# Patient Record
Sex: MALE | Race: WHITE | HISPANIC OR LATINO | Employment: UNEMPLOYED | URBAN - METROPOLITAN AREA
[De-identification: names, ages, dates, MRNs, and addresses within clinical notes are randomized per-mention and may not be internally consistent; named-entity substitution may affect disease eponyms.]

---

## 2017-06-27 ENCOUNTER — NON-PROVIDER VISIT (OUTPATIENT)
Dept: OCCUPATIONAL MEDICINE | Facility: CLINIC | Age: 57
End: 2017-06-27

## 2017-06-27 DIAGNOSIS — Z02.1 PRE-EMPLOYMENT DRUG SCREENING: ICD-10-CM

## 2017-06-27 LAB
AMP AMPHETAMINE: NORMAL
COC COCAINE: NORMAL
INT CON NEG: NORMAL
INT CON POS: NORMAL
MET METHAMPHETAMINES: NORMAL
OPI OPIATES: NORMAL
PCP PHENCYCLIDINE: NORMAL
POC DRUG COMMENT 753798-OCCUPATIONAL HEALTH: NORMAL
THC: NORMAL

## 2017-06-27 PROCEDURE — 80305 DRUG TEST PRSMV DIR OPT OBS: CPT | Performed by: PREVENTIVE MEDICINE

## 2018-07-30 ENCOUNTER — OCCUPATIONAL MEDICINE (OUTPATIENT)
Dept: URGENT CARE | Facility: PHYSICIAN GROUP | Age: 58
End: 2018-07-30
Payer: COMMERCIAL

## 2018-07-30 VITALS
RESPIRATION RATE: 18 BRPM | SYSTOLIC BLOOD PRESSURE: 138 MMHG | WEIGHT: 151 LBS | HEART RATE: 72 BPM | DIASTOLIC BLOOD PRESSURE: 94 MMHG | OXYGEN SATURATION: 98 % | BODY MASS INDEX: 24.27 KG/M2 | HEIGHT: 66 IN | TEMPERATURE: 98.7 F

## 2018-07-30 DIAGNOSIS — M54.42 ACUTE BILATERAL LOW BACK PAIN WITH LEFT-SIDED SCIATICA: ICD-10-CM

## 2018-07-30 LAB
AMP AMPHETAMINE: NORMAL
BREATH ALCOHOL COMMENT: NORMAL
COC COCAINE: NORMAL
INT CON NEG: NORMAL
INT CON POS: NORMAL
MET METHAMPHETAMINES: NORMAL
OPI OPIATES: NORMAL
PCP PHENCYCLIDINE: NORMAL
POC BREATHALIZER: 0 PERCENT (ref 0–0.01)
POC DRUG COMMENT 753798-OCCUPATIONAL HEALTH: NORMAL
THC: NORMAL

## 2018-07-30 PROCEDURE — 82075 ASSAY OF BREATH ETHANOL: CPT | Performed by: PHYSICIAN ASSISTANT

## 2018-07-30 PROCEDURE — 80305 DRUG TEST PRSMV DIR OPT OBS: CPT | Performed by: PHYSICIAN ASSISTANT

## 2018-07-30 PROCEDURE — 99203 OFFICE O/P NEW LOW 30 MIN: CPT | Mod: 29 | Performed by: PHYSICIAN ASSISTANT

## 2018-07-30 ASSESSMENT — PAIN SCALES - GENERAL: PAINLEVEL: 8=MODERATE-SEVERE PAIN

## 2018-07-30 ASSESSMENT — ENCOUNTER SYMPTOMS
CONSTITUTIONAL NEGATIVE: 1
GASTROINTESTINAL NEGATIVE: 1

## 2018-07-30 NOTE — LETTER
"EMPLOYEE’S CLAIM FOR COMPENSATION/ REPORT OF INITIAL TREATMENT  FORM C-4    EMPLOYEE’S CLAIM - PROVIDE ALL INFORMATION REQUESTED   First Name  Gab Last Name  Александр Birthdate                    1960                Sex  male Claim Number   Home Address  [address] Age  57 y.o. 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    Employer's Name  [employer]  Telephone  [phone] [phone]    Employer Address  [address]  [place]  Zip  [address]    Date of Injury  7/30/2018               Hour of Injury  2:00 PM Date Employer Notified  7/30/2018 Last Day of Work after Injury or Occupational Disease  7/30/2018 Supervisor to Whom Injury Reported  Deja Cabrera    Address or Location of Accident (if applicable)  [[address] [address]]   What were you doing at the time of accident? (if applicable)  Dumping product waste    How did this injury or occupational disease occur? (Be specific an answer in detail. 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  No Witnesses to the Accident  Timothy       Nature of Injury or Occupational Disease  Strain  Part(s) of Body Injured or Affected  Lower Back Area (Lumbar Area & Lumbo-Sacral), ,     I certify that the above is true and correct to the best of my knowledge and that I have provided this information in " order to obtain the benefits of [place]  I hereby authorize any physician, chiropractor, surgeon, practitioner, or other person, any hospital, including [facility] hospital, any medical service organization, any insurance company, or other institution or organization to release to each other, any medical or other information, including benefits paid or payable, pertinent to this injury or disease, except information relative to diagnosis, treatment and/or counseling for AIDS, psychological conditions, alcohol or controlled substances, for which I must give specific authorization.  A Photostat of this authorization shall be as valid as the original.     Date   Place   Employee’s Signature   THIS REPORT MUST BE COMPLETED AND MAILED WITHIN 3 WORKING DAYS OF TREATMENT   Place  [facility]  Name of Facility  [facility]   Date  7/30/2018 Diagnosis  (M54.42) Acute bilateral low back pain with left-sided sciatica Is there evidence the injured employee was under the influence of alcohol and/or another controlled substance at the time of accident?   Hour  5:08 PM Description of Injury or Disease  The encounter diagnosis was Acute bilateral low back pain with left-sided sciatica. No   Treatment  -restrictions as above.    -bilateral lumbar paraspinous strain.   -Toradol shot declined  -back care discussed, proper lifting mechanics discussed  -recommend heat/ice prn.  Gentle stretches daily.   -initial trial of 800 mg Ibuprofen with food up to TID prn.   -back pain red flags and ER precautions discussed with patient.   Have you advised the patient to remain off work five days or more? No   X-Ray Findings      If Yes   From Date  To Date      From information given by the employee, together with medical evidence, can you directly connect this injury or occupational disease as  "job incurred?  Yes If No Full Duty  No Modified Duty  Yes   Is additional medical care by a physician indicated?  Yes If Modified Duty, Specify any Limitations / Restrictions      Do you know of any previous injury or disease contributing to this condition or occupational disease?                            Yes  Comments:Hx of lumbar surgeries and chronic lower back pain.    Date  7/30/2018 Print Doctor’s Name Arya Soni P.A.-C. I certify the employer’s copy of  this form was mailed on:   Address  [address] [place] [address]. [address] Insurer’s Use Only     [facility] Zip  [address]    Provider’s Tax ID Number  [id number] Telephone  Dept: [phone]        e-ARYA Burciaga P.A.-C.   e-Signature: Dr. Jin Rod, Medical Director Degree  AYAH        ORIGINAL-TREATING PHYSICIAN OR CHIROPRACTOR    PAGE 2-INSURER/TPA    PAGE 3-EMPLOYER    PAGE 4-EMPLOYEE             Form C-4 (rev10/07)              BRIEF DESCRIPTION OF RIGHTS AND BENEFITS  (Pursuant to [place] 616C.050)    Notice of Injury or Occupational Disease (Incident Report Form C-1): If an injury or occupational disease (OD) arises out of and in the  course of employment, you must provide written notice to your employer as soon as practicable, but no later than 7 days after the accident or  OD. Your employer shall maintain a sufficient supply of the required forms.    Claim for Compensation (Form C-4): If medical treatment is sought, the form C-4 is available at the place of initial treatment. A completed  \"Claim for Compensation\" (Form C-4) must be filed within 90 days after an accident or OD. The treating physician or chiropractor must,  within 3 working days after treatment, complete and mail to the employer, the employer's insurer and third-party , the Claim for  Compensation.    Medical Treatment: If you require medical treatment for your on-the-job injury or OD, you may be required to select a physician or  chiropractor from " a list provided by your workers’ compensation insurer, if it has contracted with an Organization for Managed Care (MCO) or  Preferred Provider Organization (PPO) or providers of health care. If your employer has not entered into a contract with an MCO or PPO, you  may select a physician or chiropractor from the Panel of Physicians and Chiropractors. Any medical costs related to your industrial injury or  OD will be paid by your insurer.    Temporary Total Disability (TTD): If your doctor has certified that you are unable to work for a period of at least 5 consecutive days, or 5  cumulative days in a 20-day period, or places restrictions on you that your employer does not accommodate, you may be entitled to TTD  compensation.    Temporary Partial Disability (TPD): If the wage you receive upon reemployment is less than the compensation for TTD to which you are  entitled, the insurer may be required to pay you TPD compensation to make up the difference. TPD can only be paid for a maximum of 24  months.    Permanent Partial Disability (PPD): When your medical condition is stable and there is an indication of a PPD as a result of your injury or  OD, within 30 days, your insurer must arrange for an evaluation by a rating physician or chiropractor to determine the degree of your PPD. The  amount of your PPD award depends on the date of injury, the results of the PPD evaluation and your age and wage.    Permanent Total Disability (PTD): If you are medically certified by a treating physician or chiropractor as permanently and totally disabled  and have been granted a PTD status by your insurer, you are entitled to receive monthly benefits not to exceed 66 2/3% of your average  monthly wage. The amount of your PTD payments is subject to reduction if you previously received a PPD award.    Vocational Rehabilitation Services: You may be eligible for vocational rehabilitation services if you are unable to return to the job due  to a  permanent physical impairment or permanent restrictions as a result of your injury or occupational disease.    Transportation and Per Adelina Reimbursement: You may be eligible for travel expenses and per adelina associated with medical treatment.    Reopening: You may be able to reopen your claim if your condition worsens after claim closure.    Appeal Process: If you disagree with a written determination issued by the insurer or the insurer does not respond to your request, you may  appeal to the Department of Administration, , by following the instructions contained in your determination letter. You must  appeal the determination within 70 days from the date of the determination letter at [address], [address], [place], [place]  [address], or [address], [address], [place], [place] [address]. If you disagree with the  decision, you may appeal to the  Department of Administration, . You must file your appeal within 30 days from the date of the  decision  letter at [address], [address], [place], [place] [address], or [address], [address], [place], [place] [address]. If you  disagree with a decision of an , you may file a petition for judicial review with the District Court. You must do so within 30  days of the Appeal Officer’s decision. You may be represented by an  at your own expense or you may contact the [facility] for possible  representation.    [place]  for Injured Workers (NAIW): If you disagree with a  decision, you may request that NAIW represent you  without charge at an  Hearing. For information regarding denial of benefits, you may contact the [facility] at: [address], [address], [place] [address], [phone], or [address], [address], [place] [address], [phone]) [phone]    To File a Complaint with the Division: If you wish to  file a complaint with the  of the Division of Industrial Relations (DIR),  please contact the Workers’ Compensation Section, [address], [address], [place], [place] [address], telephone (260) [phone], or  [address], [address], [place], [place] [address], telephone [phone]) [phone].    For assistance with Workers’ Compensation Issues: you may contact the Office of the Governor Consumer Health Assistance, [address] [place] [address], [address], [place], [place] [address], Toll Free 1-719.741.7641, Web site: http://govcha.[facility].nv., E-mail  [email or web address].                                                                                                                                                                                                                                   __________________________________________________________________                                                                   _________________                Employee Name / Signature                                                                                                                                                       Date                                                                                                                                                                                                     D-2 (rev. 10/07)

## 2018-07-30 NOTE — LETTER
[facility] [place]  [address] - [place] [address]  Phone:  [phone] - Fax:  [phone]   [facility] Progress Report and Disability Certification  Date of Service: 2018   No Show:  No  Date / Time of Next Visit: 2018 at 9:30am [place]   Claim Information   Patient Name: Gab Fountain  Claim Number:     Employer: [employer]  Date of Injury: 2018     Insurer / TPA:   [employer] ID / SSN:     Occupation: Packeging Asst.  Diagnosis: The encounter diagnosis was Acute bilateral low back pain with left-sided sciatica.    Medical Information   Related to Industrial Injury? Yes    Subjective Complaints:  DOI: 18, 1400.  Lower back.  Patient was lifting about a 190 barrel with coworker.  He states he felt the pain as he was lifting it and dumping it over into the dumpster. The pain is lower bilateral back.  The pain is shooting down the left leg currently.  He states no new numbness or tingling in his extremities since this injury.  No changes in bladder or bowel function.    Objective Findings: Vitals review  Lumbar spine:  No swelling/ecchymosis or deformity.  There is no midline TTP.  There is moderate bilateral paraspinous muscle, TTP with spasms, 50% decrease in ROM of the spine.   5/5 strength bilateral lower extremities, normal sensation.  2+ DTRs.  Antalgic gait.    Pre-Existing Condition(s): Hx of 4 back surgeries, hx of chronic lower back pain previously on narcotic pain medication regularly.    Assessment:   Initial Visit    Status: Additional Care Required  Permanent Disability:No    Plan:      Diagnostics:      Comments:       Disability Information   Status: Released to Restricted Duty    From:  2018  Through: 2018 Restrictions are: Temporary   Physical Restrictions   Sitting:    Standing:    Stooping:    Bendin hrs/day   Squatting:    Walking:    Climbin hrs/day Pushing:      Pulling:   Other:    Reaching Above Shoulder (L):   Reaching Above Shoulder (R):       Reaching Below Shoulder (L):    Reaching Below Shoulder (R):      Not to exceed Weight Limits   Carrying(hrs):   Weight Limit(lb): < or = to 10 pounds Lifting(hrs):   Weight  Limit(lb): < or = to 10 pounds   Comments: -restrictions as above.    -bilateral lumbar paraspinous strain.   -Toradol shot declined  -back care discussed, proper lifting mechanics discussed  -recommend heat/ice prn.  Gentle stretches daily.   -back pain red flags and ER precautions discussed with patient.       Repetitive Actions   Hands: i.e. Fine Manipulations from Grasping:     Feet: i.e. Operating Foot Controls:     Driving / Operate Machinery:     Physician Name: Arya Soni P.A.-C. Physician Signature: ARYA Che P.A.-C. e-Signature: Dr. Jin Rod, Medical Director   Clinic Name / Location: [address]  [place] [address] Clinic Phone Number: Dept: [phone]   Appointment Time: 4:15 Pm Visit Start Time: 5:08 PM   Check-In Time:  4:37 Pm Visit Discharge Time:  6:17PM   Original-Treating Physician or Chiropractor    Page 2-Insurer/TPA    Page 3-Employer    Page 4-Employee

## 2018-07-30 NOTE — LETTER
[facility]  [address] - [place] [address]  Phone:  [phone] - Fax:  [phone]   [facility] Progress Report and Disability Certification  Date of Service: 7/30/2018   No Show:  No  Date / Time of Next Visit: 8/2/2018   Claim Information   Patient Name: Gab Fountain  Claim Number:     Employer: [employer]  Date of Injury: 7/30/2018     Insurer / TPA: [employer]  ID / SSN:     Occupation: Packeging Asst.  Diagnosis: The encounter diagnosis was Acute bilateral low back pain with left-sided sciatica.    Medical Information   Related to Industrial Injury? Yes    Subjective Complaints:  DOI: 07/30/18, 1400.  Lower back.  Patient was lifting about a 190 pound barrel with coworker.  He states he felt the pain in the back as he was lifting  and dumping the barrel over into the dumpster. The pain is lower bilateral back.  The pain is shooting down the left leg currently.  He states no new numbness or tingling in his extremities since this injury.  No changes in bladder or bowel function.  Patient has hx of chronic lower back pain and surgeries.  No attempted interventions thus far.    Objective Findings: Vitals review  Lumbar spine:  No swelling/ecchymosis or deformity.  There is no midline TTP.  There is moderate bilateral paraspinous muscle, TTP with spasms, 50% decrease in ROM of the spine.   5/5 strength bilateral lower extremities, normal sensation.  2+ DTRs.  Antalgic gait.    Pre-Existing Condition(s): Hx of 4 back surgeries, hx of chronic lower back pain previously on narcotic pain medication regularly.    Assessment:   Initial Visit    Status: Additional Care Required  Permanent Disability:No    Plan:      Diagnostics:      Comments:       Disability Information   Status: Released to Restricted Duty    From:  7/30/2018  Through: 8/2/2018 Restrictions are: Temporary   Physical Restrictions   Sitting:   Standing:    Stooping:    Bendin hrs/day   Squatting:    Walking:    Climbin hrs/day Pushing:      Pulling:    Other:    Reaching Above Shoulder (L):   Reaching Above Shoulder (R):       Reaching Below Shoulder (L):    Reaching Below Shoulder (R):      Not to exceed Weight Limits   Carrying(hrs):   Weight Limit(lb): < or = to 10 pounds Lifting(hrs):   Weight  Limit(lb): < or = to 10 pounds   Comments: -restrictions as above.    -bilateral lumbar paraspinous strain.   -Toradol shot declined. Patient will start with OTC NSAID dosing for acute inflammation.  Discussed may RTC if this is inadequate for something stronger after initial trial.   -back care discussed, proper lifting mechanics discussed  -recommend heat/ice prn.  Gentle stretches daily.   -back pain red flags and ER precautions discussed with patient.       Repetitive Actions   Hands: i.e. Fine Manipulations from Grasping:     Feet: i.e. Operating Foot Controls:     Driving / Operate Machinery:     Physician Name: Arya Soni P.A.-C. Physician Signature: ARYA Che P.A.-C. e-Signature: Dr. Jin Rod, Medical Director   Clinic Name / Location: [address]  [place] [address] Clinic Phone Number: Dept: [phone]   Appointment Time: 4:15 Pm Visit Start Time: 5:08 PM   Check-In Time:  4:37 Pm Visit Discharge Time: 6:47 Pm    Original-Treating Physician or Chiropractor    Page 2-Insurer/TPA    Page 3-Employer    Page 4-Employee

## 2018-07-31 NOTE — PROGRESS NOTES
Subjective:      Gab Fountain is a 57 y.o. male who presents with Low Back Pain (After doing some heavy lifting at work today)      DOI: 07/30/18, 1400.  Lower back.  Patient was lifting about a 190 pound barrel with coworker.  He states he felt the pain in the back as he was lifting  and dumping the barrel over into the dumpster. The pain is lower bilateral back.  The pain is shooting down the left leg currently.  He states no new numbness or tingling in his extremities since this injury.  No changes in bladder or bowel function.  Patient has hx of chronic lower back pain and surgeries.  No attempted interventions thus far.      HPI   As above.     Review of Systems   Constitutional: Negative.    Gastrointestinal: Negative.    Genitourinary: Negative.    Musculoskeletal:        SEE HPI   Skin: Negative.    Neurological:        No new acute     Endo/Heme/Allergies: Negative.        PMH:  has a past medical history of Infectious disease (2010).  MEDS:   Current Outpatient Prescriptions:   •  ascorbic acid (VITAMIN C) 500 MG tablet, Take 1 Tab by mouth 2 Times a Day., Disp: 30 Each, Rfl: 5  •  famotidine (PEPCID) 20 MG TABS, Take 1 Tab by mouth every evening., Disp: 30 Each, Rfl: 5  •  magnesium oxide (MAG-OX) 400 MG TABS, Take 1 Tab by mouth 2 Times a Day., Disp: 30 Each, Rfl: 5  •  multivitamin (THERAGRAN) TABS, Take 1 Tab by mouth every day., Disp: 30 Each, Rfl: 5  •  therapeutic multivitamin-minerals (THERAGRAN-M) TABS, Take 1 Tab by mouth every day., Disp: 30 Each, Rfl: 5  •  zinc sulfate (ZINCATE) 220 MG CAPS, Take 1 Cap by mouth every 12 hours., Disp: 28 Cap, Rfl: 0  •  vitamin A 53548 UNIT capsule, Take 1 Cap by mouth every day., Disp: 20 Cap, Rfl: 0  •  oxycodone SR (OXYCONTIN) 10 MG TB12, Take 2 Tabs by mouth every 12 hours., Disp: 40 Each, Rfl: 0  •  prochlorperazine (COMPAZINE) 10 MG TABS, Take 10 mg by mouth every 6 hours as needed., Disp: , Rfl:   •  oxycodone, immediate release, 10 MG TABS, Take 1  "Tab by mouth every four hours as needed ((Pain Scale 7-10))., Disp: 45 Each, Rfl: 1  ALLERGIES: No Known Allergies  SURGHX:   Past Surgical History:   Procedure Laterality Date   • INCISION AND DRAINAGE ORTHOPEDIC  5/27/2011    Procedure: LUMBAR;  Surgeon: Reggie Troy M.D.;  Location: SURGERY [facility];  Service:    • LUMBAR EXPLORATION  5/27/2011    Procedure: REMOVAL HARDWARE;  Surgeon: Reggie Troy M.D.;  Location: SURGERY [facility];  Service:    • LUMBAR FUSION POSTERIOR  5/27/2011    Procedure: L4-5 W/ALLOGRAFT ;  Surgeon: Reggie Troy M.D.;  Location: SURGERY [facility];  Service:    • INCISION AND DRAINAGE GENERAL  5/19/2011    Performed by MARY LELEN BARRAGAN at SURGERY [facility]   • HARDWARE REMOVAL NEURO  5/19/2011    Performed by REGGIE TROY at SURGERY [facility]   • OTHER      spinal fusion L1 S1 not sure  4/8/2010     SOCHX:  reports that he has never smoked. He has never used smokeless tobacco. He reports that he drinks alcohol. He reports that he does not use drugs.  FH: Family history was reviewed, no pertinent findings to report   Objective:     /94   Pulse 72   Temp 37.1 °C (98.7 °F)   Resp 18   Ht 1.676 m (5' 6\")   Wt 68.5 kg (151 lb)   SpO2 98%   BMI 24.37 kg/m²      Physical Exam   Constitutional: He is oriented to person, place, and time. He appears well-developed and well-nourished. No distress.   HENT:   Head: Normocephalic.   Eyes: Conjunctivae and EOM are normal.   Neck: Normal range of motion. Neck supple.   Cardiovascular: Normal rate and regular rhythm.    Pulmonary/Chest: Effort normal and breath sounds normal.   Neurological: He is alert and oriented to person, place, and time.   Skin: Skin is warm and dry.   Psychiatric: He has a normal mood and affect. His behavior is normal.     Vitals review  Lumbar spine:  No swelling/ecchymosis or deformity.  There is no midline TTP.  There is moderate bilateral paraspinous muscle, TTP with " spasms, 50% decrease in ROM of the spine.   5/5 strength bilateral lower extremities, normal sensation.  2+ DTRs.  Antalgic gait.        Assessment/Plan:     1. Acute bilateral low back pain with left-sided sciatica         -restrictions as above.    -bilateral lumbar paraspinous strain.   -Toradol shot declined. Patient will start with OTC NSAID dosing for acute inflammation.  Discussed may RTC if this is inadequate for something stronger after initial trial.   -back care discussed, proper lifting mechanics discussed  -recommend heat/ice prn.  Gentle stretches daily.   -back pain red flags and ER precautions discussed with patient.       Madhavi Soni P.A.-C.

## 2018-08-02 ENCOUNTER — APPOINTMENT (OUTPATIENT)
Dept: RADIOLOGY | Facility: IMAGING CENTER | Age: 58
End: 2018-08-02
Attending: PREVENTIVE MEDICINE
Payer: COMMERCIAL

## 2018-08-02 ENCOUNTER — OCCUPATIONAL MEDICINE (OUTPATIENT)
Dept: OCCUPATIONAL MEDICINE | Facility: CLINIC | Age: 58
End: 2018-08-02
Payer: COMMERCIAL

## 2018-08-02 VITALS
OXYGEN SATURATION: 96 % | WEIGHT: 151 LBS | HEIGHT: 66 IN | BODY MASS INDEX: 24.27 KG/M2 | HEART RATE: 68 BPM | SYSTOLIC BLOOD PRESSURE: 136 MMHG | RESPIRATION RATE: 16 BRPM | TEMPERATURE: 97.7 F | DIASTOLIC BLOOD PRESSURE: 90 MMHG

## 2018-08-02 DIAGNOSIS — Z98.1 HISTORY OF LUMBAR SPINAL FUSION: ICD-10-CM

## 2018-08-02 DIAGNOSIS — M54.5 CHRONIC LOW BACK PAIN, UNSPECIFIED BACK PAIN LATERALITY, WITH SCIATICA PRESENCE UNSPECIFIED: ICD-10-CM

## 2018-08-02 DIAGNOSIS — G89.29 CHRONIC LOW BACK PAIN, UNSPECIFIED BACK PAIN LATERALITY, WITH SCIATICA PRESENCE UNSPECIFIED: ICD-10-CM

## 2018-08-02 DIAGNOSIS — M54.16 LEFT LUMBAR RADICULOPATHY: ICD-10-CM

## 2018-08-02 PROCEDURE — 99204 OFFICE O/P NEW MOD 45 MIN: CPT | Performed by: PREVENTIVE MEDICINE

## 2018-08-02 PROCEDURE — 72100 X-RAY EXAM L-S SPINE 2/3 VWS: CPT | Mod: 26,29 | Performed by: PHYSICIAN ASSISTANT

## 2018-08-02 RX ORDER — PREDNISONE 20 MG/1
20 TABLET ORAL 2 TIMES DAILY
Qty: 14 TAB | Refills: 0 | Status: SHIPPED | OUTPATIENT
Start: 2018-08-02 | End: 2019-04-24

## 2018-08-02 RX ORDER — TIZANIDINE 4 MG/1
4 TABLET ORAL EVERY 6 HOURS PRN
Qty: 30 TAB | Refills: 3 | Status: SHIPPED | OUTPATIENT
Start: 2018-08-02 | End: 2020-06-26

## 2018-08-02 RX ORDER — DICLOFENAC SODIUM 75 MG/1
75 TABLET, DELAYED RELEASE ORAL 2 TIMES DAILY
Qty: 60 TAB | Refills: 1 | Status: SHIPPED | OUTPATIENT
Start: 2018-08-02 | End: 2020-06-26

## 2018-08-02 ASSESSMENT — PAIN SCALES - GENERAL: PAINLEVEL: 9=SEVERE PAIN

## 2018-08-02 NOTE — LETTER
"   [address],   [address] [place] [address]  Phone:  [phone] - Fax:  [phone]   [facility] Progress Report and Disability Certification  Date of Service: 8/2/2018   No Show:  No  Date / Time of Next Visit: 8/23/2018@8:50am   Claim Information   Patient Name: Gab Fountain  Claim Number:     Employer: [employer]  Date of Injury: 7/30/2018     Insurer / TPA: [employer]  ID / SSN:     Occupation: Packeging Asst.  Diagnosis: Diagnoses of Left lumbar radiculopathy, History of lumbar spinal fusion, and Chronic low back pain, unspecified back pain laterality, with sciatica presence unspecified were pertinent to this visit.    Medical Information   Related to Industrial Injury?   Comments:await final determination from insurance    Subjective Complaints:  Date of injury 7/30/2018.  Mechanism of injury- \"dumping product waist 5-6 cans\".  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  He was seen in urgent care 2 days ago.  He indicates he is worse.  He has constant dull aching left-sided lower back pain with intermittent sharp pains.  The pain radiates to the right calf.  He also notes numbness in the left great toe.  No urinary symptoms.  Past medical history is notable for an industrial low back injury several years ago which culminated in a lumbar fusion from L3-S1 in 2009.  Complications resulted in 3 follow-up procedures the last 2011.  He received a PPI rating of 25%.  Since that time he has been followed by the [facility] on chronic opioid medication.  His most recent visit to the clinic was 3 months ago.  Apparently, since that time he has been on no pain medication.   Objective Findings: Appearance: Well-developed, well-nourished.   Mental Status: Mood and Affect normal. Pleasant. Cooperative. Appropriate.   ENT: Oropharynx clear. Moist mucous membranes. " Hearing normal.   Eyes: Pupils reactive. Conjunctiva normal. No scleral icterus.   Neck: Trachea Midline. No thyromegaly. No masses.  Cardiovascular: Normal rate. Regular rhythm. Normal heart sounds.   Chest: Effort normal. Breath sounds clear.   Skin: Skin is warm and dry. No rash.   Musculoskeletal: Back exam shows a long lumbar surgical scar.  Mild tenderness in the left lumbosacral area.  Moderate pain with flexion.  Pain with left side bending.  Straight leg raise positive on the left.  Deep tendon reflexes 2+ and symmetric.     Pre-Existing Condition(s):     Assessment:   Condition Worsened    Status: Additional Care Required  Permanent Disability:No    Plan: MedicationMedication (NOT at Work)PTDiagnosticsConsultation    Diagnostics: X-ray    Comments:  Physiatry consultation    Disability Information   Status: Released to Restricted Duty    From:  8/2/2018  Through: 8/23/2018 Restrictions are: Temporary   Physical Restrictions   Sitting:    Standing:    Stooping:    Bending:  < or = to 1 hr/day   Squatting:    Walking:    Climbing:    Pushing:      Pulling:    Other:    Reaching Above Shoulder (L):   Reaching Above Shoulder (R):       Reaching Below Shoulder (L):    Reaching Below Shoulder (R):      Not to exceed Weight Limits   Carrying(hrs):   Weight Limit(lb):   Lifting(hrs):   Weight  Limit(lb): < or = to 10 pounds   Comments: May take sick leave from work for bedrest for 4 days as needed    Repetitive Actions   Hands: i.e. Fine Manipulations from Grasping:     Feet: i.e. Operating Foot Controls:     Driving / Operate Machinery:     Physician Name: German Oswald M.D. Physician Signature: GERMAN Brewer M.D. e-Signature: Dr. Jin Rod, Medical Director   Clinic Name / Location: [address],   [address] [place] [address] Clinic Phone Number: Dept: [phone]   Appointment Time: 1:15 Pm Visit Start Time: 1:42 PM   Check-In Time:  1:38 Pm Visit  Discharge Time:  3:47pm   Original-Treating Physician or Chiropractor    Page 2-Insurer/TPA    Page 3-Employer    Page 4-Employee

## 2018-08-02 NOTE — PROGRESS NOTES
"Subjective:      Gab Fountain is a 57 y.o. male who presents with Follow-Up ([facility] DOi 7/30/18 Lower Back Worse Room#2)      Date of injury 7/30/2018.  Mechanism of injury- \"dumping product waist 5-6 cans\".  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  He was seen in urgent care 2 days ago.  He indicates he is worse.  He has constant dull aching left-sided lower back pain with intermittent sharp pains.  The pain radiates to the right calf.  He also notes numbness in the left great toe.  No urinary symptoms.  Past medical history is notable for an industrial low back injury several years ago which culminated in a lumbar fusion from L3-S1 in 2009.  Complications resulted in 3 follow-up procedures the last 2011.  He received a PPI rating of 25%.  Since that time he has been followed by the [facility] on chronic opioid medication.  His most recent visit to the clinic was 3 months ago.  Apparently, since that time he has been on no pain medication.     HPI    ROS  Comprehensive medical history form reviewed. Pertinent positives and negatives included in HPI.    PFSH: reviewed in Epic    PMH:  has a past medical history of Infectious disease (2010).  MEDS:   Current Outpatient Prescriptions:   •  predniSONE (DELTASONE) 20 MG Tab, Take 1 Tab by mouth 2 times a day., Disp: 14 Tab, Rfl: 0  •  diclofenac EC (VOLTAREN) 75 MG Tablet Delayed Response, Take 1 Tab by mouth 2 times a day., Disp: 60 Tab, Rfl: 1  •  tizanidine (ZANAFLEX) 4 MG Tab, Take 1 Tab by mouth every 6 hours as needed., Disp: 30 Tab, Rfl: 3  •  prochlorperazine (COMPAZINE) 10 MG TABS, Take 10 mg by mouth every 6 hours as needed., Disp: , Rfl:   •  ascorbic acid (VITAMIN C) 500 MG tablet, Take 1 Tab by mouth 2 Times a Day., Disp: 30 Each, Rfl: 5  •  famotidine (PEPCID) 20 MG TABS, Take 1 Tab by mouth every evening., Disp: 30 Each, Rfl: 5  •  magnesium oxide (MAG-OX) 400 MG TABS, Take 1 Tab by mouth 2 Times " "a Day., Disp: 30 Each, Rfl: 5  •  multivitamin (THERAGRAN) TABS, Take 1 Tab by mouth every day., Disp: 30 Each, Rfl: 5  •  oxycodone, immediate release, 10 MG TABS, Take 1 Tab by mouth every four hours as needed ((Pain Scale 7-10))., Disp: 45 Each, Rfl: 1  •  therapeutic multivitamin-minerals (THERAGRAN-M) TABS, Take 1 Tab by mouth every day., Disp: 30 Each, Rfl: 5  •  zinc sulfate (ZINCATE) 220 MG CAPS, Take 1 Cap by mouth every 12 hours., Disp: 28 Cap, Rfl: 0  •  vitamin A 83859 UNIT capsule, Take 1 Cap by mouth every day., Disp: 20 Cap, Rfl: 0  •  oxycodone SR (OXYCONTIN) 10 MG TB12, Take 2 Tabs by mouth every 12 hours., Disp: 40 Each, Rfl: 0  ALLERGIES: No Known Allergies  SURGHX:   Past Surgical History:   Procedure Laterality Date   • INCISION AND DRAINAGE ORTHOPEDIC  5/27/2011    Procedure: LUMBAR;  Surgeon: Reggie Troy M.D.;  Location: [facility];  Service:    • LUMBAR EXPLORATION  5/27/2011    Procedure: REMOVAL HARDWARE;  Surgeon: Reggie Troy M.D.;  Location: [facility];  Service:    • LUMBAR FUSION POSTERIOR  5/27/2011    Procedure: L4-5 W/ALLOGRAFT ;  Surgeon: Reggie Troy M.D.;  Location: [facility];  Service:    • INCISION AND DRAINAGE GENERAL  5/19/2011    Performed by MARY ELLEN BARRAGAN at SURGERY [facility]   • HARDWARE REMOVAL NEURO  5/19/2011    Performed by REGGIE TROY at [facility]   • OTHER      spinal fusion L1 S1 not sure  4/8/2010     SOCHX:  reports that he has never smoked. He has never used smokeless tobacco. He reports that he drinks alcohol. He reports that he does not use drugs.  Work Status: Works for [employer] as a   FH: No pertinent hereditary disorders.        Objective:     /90   Pulse 68   Temp 36.5 °C (97.7 °F)   Resp 16   Ht 1.676 m (5' 6\")   Wt 68.5 kg (151 lb)   SpO2 96%   BMI 24.37 kg/m²      Physical Exam    Appearance: Well-developed, well-nourished.   Mental Status: " Mood and Affect normal. Pleasant. Cooperative. Appropriate.   ENT: Oropharynx clear. Moist mucous membranes. Hearing normal.   Eyes: Pupils reactive. Conjunctiva normal. No scleral icterus.   Neck: Trachea Midline. No thyromegaly. No masses.  Cardiovascular: Normal rate. Regular rhythm. Normal heart sounds.   Chest: Effort normal. Breath sounds clear.   Skin: Skin is warm and dry. No rash.   Musculoskeletal: Back exam shows a long lumbar surgical scar.  Mild tenderness in the left lumbosacral area.  Moderate pain with flexion.  Pain with left side bending.  Straight leg raise positive on the left.  Deep tendon reflexes 2+ and symmetric.         Assessment/Plan:     1. Left lumbar radiculopathy  2. History of lumbar spinal fusion  3. Chronic low back pain, unspecified back pain laterality, with sciatica presence unspecified  New to occupational health from urgent care  Significant pre-existing chronic component rendering causation uncertain in this case.  We will defer to specialist regarding further diagnostic testing if indicated to delineate acute component versus chronic component.  Anticipate delayed recovery.  - DX-LUMBAR SPINE-2 OR 3 VIEWS; Future  - predniSONE (DELTASONE) 20 MG Tab; Take 1 Tab by mouth 2 times a day.  Dispense: 14 Tab; Refill: 0  - diclofenac EC (VOLTAREN) 75 MG Tablet Delayed Response; Take 1 Tab by mouth 2 times a day.  Dispense: 60 Tab; Refill: 1  - tizanidine (ZANAFLEX) 4 MG Tab; Take 1 Tab by mouth every 6 hours as needed.  Dispense: 30 Tab; Refill: 3  - REFERRAL TO PHYSICAL THERAPY Reason for Therapy: Eval/Treat/Report  - REFERRAL TO PHYSIATRY (PMR)  Restricted activity  Recheck in 3 weeks to document progress

## 2018-08-08 ENCOUNTER — NON-PROVIDER VISIT (OUTPATIENT)
Dept: OCCUPATIONAL MEDICINE | Facility: CLINIC | Age: 58
End: 2018-08-08
Payer: COMMERCIAL

## 2018-08-08 PROCEDURE — 8911 PR MRO FEE: Performed by: INTERNAL MEDICINE

## 2019-04-17 ENCOUNTER — NON-PROVIDER VISIT (OUTPATIENT)
Dept: OCCUPATIONAL MEDICINE | Facility: CLINIC | Age: 59
End: 2019-04-17

## 2019-04-17 DIAGNOSIS — Z02.83 ENCOUNTER FOR DRUG SCREENING: ICD-10-CM

## 2019-04-17 PROCEDURE — 8907 PR URINE COLLECT ONLY: Performed by: PREVENTIVE MEDICINE

## 2019-04-24 ENCOUNTER — OFFICE VISIT (OUTPATIENT)
Dept: OCCUPATIONAL MEDICINE | Facility: CLINIC | Age: 59
End: 2019-04-24

## 2019-04-24 VITALS
RESPIRATION RATE: 16 BRPM | HEART RATE: 88 BPM | WEIGHT: 155 LBS | TEMPERATURE: 98 F | OXYGEN SATURATION: 97 % | HEIGHT: 65 IN | BODY MASS INDEX: 25.83 KG/M2

## 2019-04-24 DIAGNOSIS — Z02.4 ENCOUNTER FOR COMMERCIAL DRIVER MEDICAL EXAMINATION (CDME): ICD-10-CM

## 2019-04-24 PROCEDURE — 7100 PR DOT PHYSICAL: Performed by: PREVENTIVE MEDICINE

## 2019-12-01 ENCOUNTER — HOSPITAL ENCOUNTER (EMERGENCY)
Facility: MEDICAL CENTER | Age: 59
End: 2019-12-01
Attending: EMERGENCY MEDICINE

## 2019-12-01 VITALS
DIASTOLIC BLOOD PRESSURE: 112 MMHG | HEART RATE: 71 BPM | TEMPERATURE: 96.8 F | OXYGEN SATURATION: 97 % | SYSTOLIC BLOOD PRESSURE: 167 MMHG | RESPIRATION RATE: 16 BRPM | BODY MASS INDEX: 28.06 KG/M2 | HEIGHT: 65 IN | WEIGHT: 168.43 LBS

## 2019-12-01 DIAGNOSIS — R05.9 COUGH: ICD-10-CM

## 2019-12-01 PROCEDURE — 700102 HCHG RX REV CODE 250 W/ 637 OVERRIDE(OP): Performed by: EMERGENCY MEDICINE

## 2019-12-01 PROCEDURE — 99283 EMERGENCY DEPT VISIT LOW MDM: CPT

## 2019-12-01 PROCEDURE — A9270 NON-COVERED ITEM OR SERVICE: HCPCS | Performed by: EMERGENCY MEDICINE

## 2019-12-01 RX ORDER — DOXYCYCLINE 100 MG/1
100 CAPSULE ORAL 2 TIMES DAILY
Qty: 20 CAP | Refills: 0 | Status: SHIPPED | OUTPATIENT
Start: 2019-12-01 | End: 2019-12-08

## 2019-12-01 RX ORDER — DOXYCYCLINE 100 MG/1
100 TABLET ORAL ONCE
Status: COMPLETED | OUTPATIENT
Start: 2019-12-01 | End: 2019-12-01

## 2019-12-01 RX ORDER — DEXTROMETHORPHAN HBR AND GUAIFENESIN 5; 100 MG/5ML; MG/5ML
5-10 LIQUID ORAL EVERY 6 HOURS PRN
Qty: 1 BOTTLE | Refills: 0 | Status: SHIPPED | OUTPATIENT
Start: 2019-12-01 | End: 2021-03-19

## 2019-12-01 RX ORDER — DEXAMETHASONE 4 MG/1
12 TABLET ORAL ONCE
Status: DISCONTINUED | OUTPATIENT
Start: 2019-12-01 | End: 2019-12-01 | Stop reason: HOSPADM

## 2019-12-01 RX ADMIN — DOXYCYCLINE 100 MG: 100 TABLET, FILM COATED ORAL at 13:32

## 2019-12-01 ASSESSMENT — PAIN SCALES - WONG BAKER: WONGBAKER_NUMERICALRESPONSE: HURTS A WHOLE LOT

## 2019-12-01 ASSESSMENT — LIFESTYLE VARIABLES: DO YOU DRINK ALCOHOL: NO

## 2019-12-01 NOTE — ED TRIAGE NOTES
Chief Complaint   Patient presents with   • Cough     x 1 week. coughing yellowish to greenish phlegm.    • Sore Throat     x 1 week. states painful to swallow.      Taking OTC theraflu w/o any relief of symptoms. States that he feels very congestion. Able to speak in full sentences. Educated on triage process. Instructed to notify staff for any changes. Also asking medication to help him sleep at night.

## 2019-12-01 NOTE — ED PROVIDER NOTES
ED Provider Note    CHIEF COMPLAINT  Chief Complaint   Patient presents with   • Cough     x 1 week. coughing yellowish to greenish phlegm.    • Sore Throat     x 1 week. states painful to swallow.        HPI  Gab Fountain is a 59 y.o. male who presents planing cough.  Duration about 1 week.  Describes it as intermittent worse at night.  Unable get some sleep.  Associated symptoms right-sided pleuritic back pain.  Yellowish/brown sputum.  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  He states that he is working a lot of dirty close and dust in the air.  He states that 2 of his coworkers were out sick before he started.    REVIEW OF SYSTEMS  General: Objective fever chills  Eyes: No eye discharge. No eye pain.  Ear nose throat: No sore throat or  trouble swallowing.  Pulmonary: See above.  Cardiovascular: No chest pain or chest pressure.  GI: No abdominal pain nausea or vomiting.  : No dysuria or hematuria      All other systems are negative      PAST MEDICAL HISTORY  Past Medical History:   Diagnosis Date   • Infectious disease 2010    MRSA       FAMILY HISTORY  No family history on file.    SOCIAL HISTORY  Social History     Socioeconomic History   • Marital status:      Spouse name: Not on file   • Number of children: Not on file   • Years of education: Not on file   • Highest education level: Not on file   Occupational History   • Not on file   Social Needs   • Financial resource strain: Not on file   • Food insecurity:     Worry: Not on file     Inability: Not on file   • Transportation  needs:     Medical: Not on file     Non-medical: Not on file   Tobacco Use   • Smoking status: Never Smoker   • Smokeless tobacco: Never Used   Substance and Sexual Activity   • Alcohol use: Yes     Comment: rare   • Drug use: No   • Sexual activity: Not on file   Lifestyle   • Physical activity:     Days per week: Not on file     Minutes per session: Not on file   • Stress: Not on file   Relationships   • Social connections:     Talks on phone: Not on file     Gets together: Not on file     Attends Mu-ism service: Not on file     Active member of club or organization: Not on file     Attends meetings of clubs or organizations: Not on file     Relationship status: Not on file   • Intimate partner violence:     Fear of current or ex partner: Not on file     Emotionally abused: Not on file     Physically abused: Not on file     Forced sexual activity: Not on file   Other Topics Concern   • Not on file   Social History Narrative   • Not on file       SURGICAL HISTORY  Past Surgical History:   Procedure Laterality Date   • INCISION AND DRAINAGE ORTHOPEDIC  5/27/2011    Procedure: LUMBAR;  Surgeon: Reggie Troy M.D.;  Location: SURGERY [facility];  Service:    • LUMBAR EXPLORATION  5/27/2011    Procedure: REMOVAL HARDWARE;  Surgeon: Reggie Troy M.D.;  Location: [facility];  Service:    • LUMBAR FUSION POSTERIOR  5/27/2011    Procedure: L4-5 W/ALLOGRAFT ;  Surgeon: Reggie Troy M.D.;  Location: [facility];  Service:    • INCISION AND DRAINAGE GENERAL  5/19/2011    Performed by MARY ELLEN BARRAGAN at SURGERY [facility]   • HARDWARE REMOVAL NEURO  5/19/2011    Performed by REGGIE TROY at [facility]   • OTHER      spinal fusion L1 S1 not sure  4/8/2010       CURRENT MEDICATIONS  Home Medications     Reviewed by Noni Sparks R.N. (Registered Nurse) on 12/01/19 at 1201  Med List Status: Complete   Medication Last Dose Status   ascorbic acid (VITAMIN C) 500 MG  "tablet not taking Active   diclofenac EC (VOLTAREN) 75 MG Tablet Delayed Response not taking Active   famotidine (PEPCID) 20 MG TABS not taking Active   magnesium oxide (MAG-OX) 400 MG TABS not taking Active   multivitamin (THERAGRAN) TABS not taking Active   prochlorperazine (COMPAZINE) 10 MG TABS not taking Active   therapeutic multivitamin-minerals (THERAGRAN-M) TABS not taking Active   tizanidine (ZANAFLEX) 4 MG Tab not taking Active   vitamin A 78098 UNIT capsule not taking Active   zinc sulfate (ZINCATE) 220 MG CAPS not taking Active                ALLERGIES  No Known Allergies    PHYSICAL EXAM  VITAL SIGNS: BP (!) 186/115   Pulse 73   Temp 36 °C (96.8 °F) (Temporal)   Resp 16   Ht 1.651 m (5' 5\")   Wt 76.4 kg (168 lb 6.9 oz)   SpO2 97%   BMI 28.03 kg/m²    Constitutional: Well developed, Well nourished, no acute distress,   HENT: Normocephalic, Atraumatic, Oropharynx moist, No oral exudates, Nose normal.   Eyes: PERRLA, EOMI, Conjunctiva normal, No discharge.   Musculoskeletal: Neck normal range of motion, No tenderness, Supple,  Cardiovascular: Regular rhythm rate of 70 no murmurs.  Thorax & Lungs: Questionable rhonchi in the left lower lobe diffuse with expiratory.  No wheezes good air movement.  No rhonchi.  Abdomen: Bowel sounds normal, Soft, No tenderness, No masses, No pulsatile masses.   Skin: Warm, Dry, No erythema, No rash.   : No CVA tenderness.   Neurologic: Alert & oriented , moves all extremities equally  Psychiatric:  Calm, not anxious      RADIOLOGY/PROCEDURES  Patient received doxycycline here.    COURSE & MEDICAL DECISION MAKING  Pertinent Labs & Imaging studies reviewed. (See chart for details)  Patient with possible bronchitis versus possible early pneumonia with left-sided sided scant rhonchi.  Patient had coughing and he could not fully cleared out.  Oxygen saturation 97% he is no fever here    Please note he does have a blood pressure of 186/115 I took visit scan and the patient " had 138 systolic blood pressure in 2018    We will recheck before discharge below 160 he will follow-up as an outpatient.  Did not find any blood pressure medications on his medication list.    I discussed this at length with the patient.  Patient is to return in 2 to 3 days not improved.  Patient was was very appreciative as he states he is self-pay.    FINAL IMPRESSION  1.  Bronchitis versus early pneumonia.  2.  Elevated blood pressure.  3.      Electronically signed by: Chandan Samuels, 12/1/2019 1:12 PM

## 2020-06-26 ENCOUNTER — HOSPITAL ENCOUNTER (EMERGENCY)
Facility: MEDICAL CENTER | Age: 60
End: 2020-06-26
Attending: EMERGENCY MEDICINE
Payer: COMMERCIAL

## 2020-06-26 VITALS
HEIGHT: 66 IN | SYSTOLIC BLOOD PRESSURE: 170 MMHG | DIASTOLIC BLOOD PRESSURE: 106 MMHG | RESPIRATION RATE: 18 BRPM | HEART RATE: 72 BPM | OXYGEN SATURATION: 94 % | TEMPERATURE: 97.5 F | BODY MASS INDEX: 25.58 KG/M2 | WEIGHT: 159.17 LBS

## 2020-06-26 DIAGNOSIS — M54.32 SCIATICA OF LEFT SIDE: ICD-10-CM

## 2020-06-26 PROCEDURE — 51798 US URINE CAPACITY MEASURE: CPT

## 2020-06-26 PROCEDURE — 700111 HCHG RX REV CODE 636 W/ 250 OVERRIDE (IP): Performed by: EMERGENCY MEDICINE

## 2020-06-26 PROCEDURE — 99284 EMERGENCY DEPT VISIT MOD MDM: CPT

## 2020-06-26 PROCEDURE — 96372 THER/PROPH/DIAG INJ SC/IM: CPT

## 2020-06-26 RX ORDER — MORPHINE SULFATE 10 MG/ML
10 INJECTION, SOLUTION INTRAMUSCULAR; INTRAVENOUS ONCE
Status: COMPLETED | OUTPATIENT
Start: 2020-06-26 | End: 2020-06-26

## 2020-06-26 RX ORDER — IBUPROFEN 800 MG/1
800 TABLET ORAL EVERY 8 HOURS PRN
Qty: 15 TAB | Refills: 0 | Status: SHIPPED | OUTPATIENT
Start: 2020-06-26 | End: 2021-02-16

## 2020-06-26 RX ORDER — ONDANSETRON 4 MG/1
4 TABLET, ORALLY DISINTEGRATING ORAL ONCE
Status: COMPLETED | OUTPATIENT
Start: 2020-06-26 | End: 2020-06-26

## 2020-06-26 RX ORDER — PREDNISONE 20 MG/1
40 TABLET ORAL DAILY
Qty: 12 TAB | Refills: 0 | Status: SHIPPED | OUTPATIENT
Start: 2020-06-26 | End: 2020-07-02

## 2020-06-26 RX ORDER — CYCLOBENZAPRINE HCL 10 MG
10 TABLET ORAL 3 TIMES DAILY PRN
Qty: 30 TAB | Refills: 0 | Status: SHIPPED | OUTPATIENT
Start: 2020-06-26 | End: 2021-02-16

## 2020-06-26 RX ADMIN — MORPHINE SULFATE 10 MG: 10 INJECTION INTRAVENOUS at 15:32

## 2020-06-26 RX ADMIN — ONDANSETRON 4 MG: 4 TABLET, ORALLY DISINTEGRATING ORAL at 15:31

## 2020-06-26 ASSESSMENT — PAIN DESCRIPTION - DESCRIPTORS: DESCRIPTORS: STABBING

## 2020-06-26 NOTE — LETTER
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 Diagnosis: The encounter diagnosis was Sciatica of left side.    Medical Information   Related to Industrial Injury? Yes    Subjective Complaints:  Acute exacerbation of chronic back pain after work-related injury   Objective Findings: Spinal tenderness and sciatica   Pre-Existing Condition(s): Chronic back pain   Assessment:   Condition Worsened    Status: Additional Care Required  Permanent Disability:No    Plan: Medication    Diagnostics:      Comments:       Disability Information   Status: Temporarily Totally Disabled    From:     Through:   Restrictions are:     Physical Restrictions   Sitting:    Standing:    Stooping:    Bending:      Squatting:    Walking:    Climbing:    Pushing:      Pulling:    Other:    Reaching Above Shoulder (L):   Reaching Above Shoulder (R):       Reaching Below Shoulder (L):    Reaching Below Shoulder (R):      Not to exceed Weight Limits   Carrying(hrs):   Weight Limit(lb):   Lifting(hrs):   Weight  Limit(lb):     Comments: No working for the next 5 days    Repetitive Actions   Hands: i.e. Fine Manipulations from Grasping:     Feet: i.e. Operating Foot Controls:     Driving / Operate Machinery:     Physician Name: Reggie Womack Physician Signature: REGGIE Blackburn M.D. e-Signature:  , Medical Director   Clinic Name / Location: [facility], EMERGENCY DEPT  [address] [place] [address]  [phone]     Clinic Phone Number:  Dept: [phone]   Appointment Time:  Visit Start Time:    Check-In Time:  2:39 PM Visit Discharge Time:    Original-Treating Physician or Chiropractor    Page 2-Insurer/TPA    Page 3-Employer    Page 4-Employee

## 2020-06-26 NOTE — ED TRIAGE NOTES
"Gab Fountain  59 y.o.  Chief Complaint   Patient presents with   • Low Back Pain     X4 days ago.  Pt pervious Lumbar Fusion 2011.         Patient to triage from [facility] ambulatory but in visible discomfort.  Pt states he had previous lumbar fusion in 2011 and saw Dr. Littlejohn for pain management until 2013 when insurance changed and hasn't seen since.  Pt states he had recent \"flare up\" 4 days ago and has been unable to get OOB for 3 days until today.      Vitals:    06/26/20 1441   BP: (!) 195/121   Pulse: 72   Resp:    Temp:    SpO2:        Triage process explained to patient, apologized for wait time, and returned to [facility].  Pt informed to notify staff of any change in condition. NAD at this time.    "

## 2020-06-26 NOTE — ED PROVIDER NOTES
ED Provider Note    CHIEF COMPLAINT  Chief Complaint   Patient presents with   • Low Back Pain     X4 days ago.  Pt pervious Lumbar Fusion 2011.         HPI  Gab Fountain is a 59 y.o. adult who presents complaining of low back pain.  The back pain is located in the left lower spine.  Radiates in the left buttock with numbness tingling and paresthesias down to his toe.  Patient states he has chronic pain.  He has had exacerbation the last 4 days as he has been doing quite a bit of lifting at work.  Patient states he has had spine fusion.  Symptoms have been getting worse over the last 4 days.    REVIEW OF SYSTEMS  See HPI for further details.  No fever no chills.  No cough or cold symptoms.  No vomiting or diarrhea.  No fecal incontinence or urinary retention.  All other systems are negative.    PAST MEDICAL HISTORY  Past Medical History:   Diagnosis Date   • Infectious disease 2010    MRSA       FAMILY HISTORY  History reviewed. No pertinent family history.    SOCIAL HISTORY  Social History     Socioeconomic History   • Marital status:      Spouse name: Not on file   • Number of children: Not on file   • Years of education: Not on file   • Highest education level: Not on file   Occupational History   • Not on file   Social Needs   • Financial resource strain: Not on file   • Food insecurity     Worry: Not on file     Inability: Not on file   • Transportation needs     Medical: Not on file     Non-medical: Not on file   Tobacco Use   • Smoking status: Never Smoker   • Smokeless tobacco: Never Used   Substance and Sexual Activity   • Alcohol use: Yes     Comment: rare   • Drug use: No   • Sexual activity: Not on file   Lifestyle   • Physical activity     Days per week: Not on file     Minutes per session: Not on file   • Stress: Not on file   Relationships   • Social connections     Talks on phone: Not on file     Gets together: Not on file     Attends Gnosticism service: Not on file     Active member of  club or organization: Not on file     Attends meetings of clubs or organizations: Not on file     Relationship status: Not on file   • Intimate partner violence     Fear of current or ex partner: Not on file     Emotionally abused: Not on file     Physically abused: Not on file     Forced sexual activity: Not on file   Other Topics Concern   • Not on file   Social History Narrative   • Not on file       SURGICAL HISTORY  Past Surgical History:   Procedure Laterality Date   • INCISION AND DRAINAGE ORTHOPEDIC  5/27/2011    Procedure: LUMBAR;  Surgeon: Reggie Troy M.D.;  Location: SURGERY [facility];  Service:    • LUMBAR EXPLORATION  5/27/2011    Procedure: REMOVAL HARDWARE;  Surgeon: Reggie Troy M.D.;  Location: SURGERY [facility];  Service:    • LUMBAR FUSION POSTERIOR  5/27/2011    Procedure: L4-5 W/ALLOGRAFT ;  Surgeon: Reggie Troy M.D.;  Location: SURGERY [facility];  Service:    • INCISION AND DRAINAGE GENERAL  5/19/2011    Performed by MARY ELLEN BARRAGAN at SURGERY [facility]   • HARDWARE REMOVAL NEURO  5/19/2011    Performed by REGGIE TROY at SURGERY [facility]   • OTHER      spinal fusion L1 S1 not sure  4/8/2010       CURRENT MEDICATIONS  Home Medications     Reviewed by Samia Pierre R.N. (Registered Nurse) on 06/26/20 at 1452  Med List Status: Partial   Medication Last Dose Status   ascorbic acid (VITAMIN C) 500 MG tablet  Active   Dextromethorphan-guaiFENesin 5-100 MG/5ML Liquid  Active   diclofenac EC (VOLTAREN) 75 MG Tablet Delayed Response  Active   famotidine (PEPCID) 20 MG TABS  Active   magnesium oxide (MAG-OX) 400 MG TABS  Active   multivitamin (THERAGRAN) TABS  Active   prochlorperazine (COMPAZINE) 10 MG TABS  Active   therapeutic multivitamin-minerals (THERAGRAN-M) TABS  Active   tizanidine (ZANAFLEX) 4 MG Tab  Active   vitamin A 67938 UNIT capsule  Active   zinc sulfate (ZINCATE) 220 MG CAPS  Active                ALLERGIES  No Known  "Allergies    PHYSICAL EXAM  VITAL SIGNS: BP (!) 170/106   Pulse 72   Temp 36.8 °C (98.2 °F) (Temporal)   Resp 18   Ht 1.676 m (5' 6\")   Wt 72.2 kg (159 lb 2.8 oz)   SpO2 94%   BMI 25.69 kg/m²   Constitutional: Well developed, Well nourished, No acute distress, Non-toxic appearance.   Neck: Normal range of motion, No tenderness, Supple, No stridor. No nuchal rigidity  Cardiovascular: Normal heart rate, Normal rhythm, No murmurs, No rubs, No gallops.   Thorax & Lungs: Normal breath sounds, No respiratory distress, No wheezing, No chest tenderness.   Abdomen: Bowel sounds normal, Soft, No tenderness, No masses, No pulsatile masses.   Skin: Warm, Dry, No erythema, No rash.   Back: There is a left sacroiliac region.  Extremities: No edema, No tenderness, No cyanosis, No clubbing. Dorsalis pedis pulses 2+ equal bilaterally. Radial pulses 2+ equal bilaterally  Neurologic: Alert & oriented x 3, Normal motor function, Normal sensory function, No focal deficits noted.  Positive straight leg raise in the left        RADIOLOGY/PROCEDURES  Not indicated    COURSE & MEDICAL DECISION MAKING  Pertinent Labs & Imaging studies reviewed. (See chart for details)  Patient has exacerbation of chronic back pain.  There is no red flags to suggest epidural abscess or cauda equina syndrome.  The post void bladder scan shows 150 cc.  There is no sign of urinary retention.  Patient was reassured and discharged home in stable condition.  I prescribed the patient prednisone Flexeril and ibuprofen prescription strength.      FINAL IMPRESSION  1.  Acute exacerbation of chronic low back pain  2.   3.      Please note that this dictation was created using voice recognition software. I have worked with consultants from the vendor as well as technical experts from [facility] to optimize the interface. I have made every reasonable attempt to correct obvious errors, but I expect that there are errors of grammar and possibly content that I did " not discover before finalizing the note.      Electronically signed by: Chaim Womack M.D., 6/26/2020 4:59 PM

## 2020-06-26 NOTE — Clinical Note
Gab Александр was seen and treated in our emergency department on 6/26/2020.  She may return to work on 07/01/2020.       If you have any questions or concerns, please don't hesitate to call.      Chaim Womack M.D.

## 2020-06-27 NOTE — DISCHARGE PLANNING
Patient called wanting  work note because the one he had said she. Informed his scripts are at [facility].

## 2020-06-27 NOTE — ED NOTES
Discharge instructions reviewed with pt. All questions answered and pt verbalizes understanding. Pt is ambulatory to exit to wait for son as responsible . Pt re educated on the fact he was given a narcotic and he cannot drive the rest of today. Pt verbalizes understanding and states his son is pickinghim up

## 2020-06-27 NOTE — ED NOTES
In patient chart to find  or RN for new work note. Patient said the printed work note he was given lists him as female and wanted it fixed.

## 2021-02-16 ENCOUNTER — APPOINTMENT (OUTPATIENT)
Dept: RADIOLOGY | Facility: IMAGING CENTER | Age: 61
End: 2021-02-16
Attending: PHYSICIAN ASSISTANT
Payer: COMMERCIAL

## 2021-02-16 ENCOUNTER — OCCUPATIONAL MEDICINE (OUTPATIENT)
Dept: URGENT CARE | Facility: CLINIC | Age: 61
End: 2021-02-16
Payer: COMMERCIAL

## 2021-02-16 VITALS
DIASTOLIC BLOOD PRESSURE: 96 MMHG | RESPIRATION RATE: 16 BRPM | SYSTOLIC BLOOD PRESSURE: 152 MMHG | HEIGHT: 66 IN | TEMPERATURE: 97.6 F | BODY MASS INDEX: 24.91 KG/M2 | WEIGHT: 155 LBS | OXYGEN SATURATION: 96 % | HEART RATE: 78 BPM

## 2021-02-16 DIAGNOSIS — M79.605 LUMBAR PAIN WITH RADIATION DOWN LEFT LEG: ICD-10-CM
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
## 2021-02-16 DIAGNOSIS — M54.50 LUMBAR PAIN WITH RADIATION DOWN LEFT LEG: ICD-10-CM

## 2021-02-16 DIAGNOSIS — S39.012A STRAIN OF LUMBAR REGION, INITIAL ENCOUNTER: ICD-10-CM

## 2021-02-16 DIAGNOSIS — Z02.1 PRE-EMPLOYMENT DRUG SCREENING: ICD-10-CM

## 2021-02-16 LAB
AMP AMPHETAMINE: NORMAL
COC COCAINE: NORMAL
INT CON NEG: NEGATIVE
INT CON POS: POSITIVE
MET METHAMPHETAMINES: NORMAL
OPI OPIATES: NORMAL
PCP PHENCYCLIDINE: NORMAL
POC DRUG COMMENT 753798-OCCUPATIONAL HEALTH: NORMAL
THC: NORMAL

## 2021-02-16 PROCEDURE — 72100 X-RAY EXAM L-S SPINE 2/3 VWS: CPT | Mod: TC | Performed by: PHYSICIAN ASSISTANT

## 2021-02-16 PROCEDURE — 99214 OFFICE O/P EST MOD 30 MIN: CPT | Performed by: PHYSICIAN ASSISTANT

## 2021-02-16 PROCEDURE — 80305 DRUG TEST PRSMV DIR OPT OBS: CPT | Performed by: PHYSICIAN ASSISTANT

## 2021-02-16 RX ORDER — CYCLOBENZAPRINE HCL 10 MG
10 TABLET ORAL 3 TIMES DAILY PRN
Qty: 20 TABLET | Refills: 0 | Status: SHIPPED | OUTPATIENT
Start: 2021-02-16 | End: 2021-08-06

## 2021-02-16 RX ORDER — KETOROLAC TROMETHAMINE 30 MG/ML
60 INJECTION, SOLUTION INTRAMUSCULAR; INTRAVENOUS ONCE
Status: COMPLETED | OUTPATIENT
Start: 2021-02-16 | End: 2021-02-16

## 2021-02-16 RX ORDER — NAPROXEN 500 MG/1
500 TABLET ORAL 2 TIMES DAILY WITH MEALS
Qty: 30 TABLET | Refills: 0 | Status: SHIPPED | OUTPATIENT
Start: 2021-02-16 | End: 2021-08-06

## 2021-02-16 RX ADMIN — KETOROLAC TROMETHAMINE 60 MG: 30 INJECTION, SOLUTION INTRAMUSCULAR; INTRAVENOUS at 13:46

## 2021-02-16 NOTE — LETTER
"EMPLOYEE’S CLAIM FOR COMPENSATION/ REPORT OF INITIAL TREATMENT  FORM C-4    EMPLOYEE’S CLAIM - PROVIDE ALL INFORMATION REQUESTED   First Name  Gab Last Name  [place] Birthdate                    1960                Sex  adult Claim Number   Home Address  [address] Age  60 y.o. Height  1.676 m (5' 6\") Weight  70.3 kg (155 lb) [facility]     [place] Zip  [address] Telephone  [phone] (home)    Mailing Address  [address] [place]  [address] Primary Language Spoken  English    Insurer   Third Party   Johnnie Littlejohn   Employee's Occupation (Job Title) When Injury or Occupational Disease Occurred      Employer's Name  [employer]  Telephone  [phone]    Employer Address  [address]  [place] Zip   [address]   Date of Injury  2/11/2021               Hour of Injury  1:30 PM Date Employer Notified  2/11/2021 Last Day of Work after Injury     or Occupational Disease  2/15/2021 Supervisor to Whom Injury     Reported  Elmer cooper   Address or Location of Accident (if applicable)  [[address] [place] [address]]   What were you doing at the time of accident? (if applicable)  Climbed a ladder to remove a box that was stuck    How did this injury or occupational disease occur? (Be specific an answer in detail. Use additional sheet if necessary)  I climed up a ladder to fix a box that was stuck on the coney or belt-  As I was climbing down, suppey fell   If you believe that you have an occupational disease, when did you first have knowledge of the disability and it relationship to your employment?  N/A Witnesses to the Accident  N/A      Nature of Injury or Occupational Disease  Strain  Part(s) of Body Injured or Affected  Lower Back Area (Lumbar Area & Lumbo-Sacral), ,     I certify that the above is true and correct to the best of my knowledge and that I have provided " this information in order to obtain the benefits of [place]  I hereby authorize any physician, chiropractor, surgeon, practitioner, or other person, any hospital, including [facility] or [facility] hospital, any medical service organization, any insurance company, or other institution or organization to release to each other, any medical or other information, including benefits paid or payable, pertinent to this injury or disease, except information relative to diagnosis, treatment and/or counseling for AIDS, psychological conditions, alcohol or controlled substances, for which I must give specific authorization.  A Photostat of this authorization shall be as valid as the original.     Date   Place   Employee’s Signature   THIS REPORT MUST BE COMPLETED AND MAILED WITHIN 3 WORKING DAYS OF TREATMENT   Place  [facility]  Name of [facility]   Date  2/16/2021 Diagnosis  (W19.XXXA) Fall, initial encounter  (M54.5,  M79.605) Lumbar pain with radiation down left leg  (S39.012A) Strain of lumbar region, initial encounter Is there evidence the injured employee was under the              influence of alcohol and/or another controlled substance at the time of accident?   Hour  12:28 PM Description of Injury or Disease  Diagnoses of Fall, initial encounter, Lumbar pain with radiation down left leg, and Strain of lumbar region, initial encounter were pertinent to this visit. No   Treatment  X-ray negative  No injection in clinic  Flexeril and naproxen at home for nighttime use  OTC meds and conservative measures as discussed  Follow-up 3 days  Have you advised the patient to remain off work five days or     more? No   X-Ray Findings  Negative   If Yes   From Date  To Date      From information given by the employee, together with medical evidence, can you directly connect this injury or  "occupational disease as job incurred?  Yes If No Full Duty    No Modified Duty  Yes   Is additional medical care by a physician indicated?  Yes If Modified Duty, Specify any Limitations / Restrictions  No stooping, bending, squatting, climbing, pushing, pulling  Weight limit 10 pounds   Do you know of any previous injury or disease contributing to this condition or occupational disease?                            Yes  Comments:2009: Lumbar S1-L3 fusion.  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 2010: Surgery for hardware removal. 2011: surgery for abscess. 4 surgeries in total. Has been in chronic pain.   Date  2/16/2021 Print Doctor’s Name   Isak Hager P.A.-C. I certify the employer’s copy of  this form was mailed on:   Address  [address] Insurer’s Use Only     [place]  [address]    Provider’s Tax ID Number  [id number] Telephone  Dept: [phone]      ISAK Lechuga P.A.-C.  Signature:     Degree          ORIGINAL-TREATING PHYSICIAN OR CHIROPRACTOR    PAGE 2-INSURER/TPA    PAGE 3-EMPLOYER    PAGE 4-EMPLOYEE        Form C-4 (rev.10/07)           BRIEF DESCRIPTION OF RIGHTS AND BENEFITS  (Pursuant to [place] 616C.[place])    Notice of Injury or Occupational Disease (Incident Report Form C-1): If an injury or occupational disease (OD) arises out of and in the course of employment, you must provide written notice to your employer as soon as practicable, but no later than 7 days after the accident or OD. Your employer shall maintain a sufficient supply of the required forms.    Claim for Compensation (Form C-4): If medical treatment is sought, the form C-4 is available at the place of initial treatment. A completed \"Claim for Compensation\" (Form C-4) must be filed within 90 days after an accident or OD. The treating physician or chiropractor must, within 3 working days after treatment, complete and mail to the employer, the employer's insurer and third-party " , the Claim for Compensation.    Medical Treatment: If you require medical treatment for your on-the-job injury or OD, you may be required to select a physician or chiropractor from a list provided by your workers’ compensation insurer, if it has contracted with an Organization for Managed Care (MCO) or Preferred Provider Organization (PPO) or providers of health care. If your employer has not entered into a contract with an MCO or PPO, you may select a physician or chiropractor from the Panel of Physicians and Chiropractors. Any medical costs related to your industrial injury or OD will be paid by your insurer.    Temporary Total Disability (TTD): If your doctor has certified that you are unable to work for a period of at least 5 consecutive days, or 5 cumulative days in a 20-day period, or places restrictions on you that your employer does not accommodate, you may be entitled to TTD compensation.    Temporary Partial Disability (TPD): If the wage you receive upon reemployment is less than the compensation for TTD to which you are entitled, the insurer may be required to pay you TPD compensation to make up the difference. TPD can only be paid for a maximum of 24 months.    Permanent Partial Disability (PPD): When your medical condition is stable and there is an indication of a PPD as a result of your injury or OD, within 30 days, your insurer must arrange for an evaluation by a rating physician or chiropractor to determine the degree of your PPD. The amount of your PPD award depends on the date of injury, the results of the PPD evaluation, your age and wage.    Permanent Total Disability (PTD): If you are medically certified by a treating physician or chiropractor as permanently and totally disabled and have been granted a PTD status by your insurer, you are entitled to receive monthly benefits not to exceed 66 2/3% of your average monthly wage. The amount of your PTD payments is subject to reduction  if you previously received a lump-sum PPD award.    Vocational Rehabilitation Services: You may be eligible for vocational rehabilitation services if you are unable to return to the job due to a permanent physical impairment or permanent restrictions as a result of your injury or occupational disease.    Transportation and Per Adelina Reimbursement: You may be eligible for travel expenses and per adelina associated with medical treatment.    Reopening: You may be able to reopen your claim if your condition worsens after claim closure.     Appeal Process: If you disagree with a written determination issued by the insurer or the insurer does not respond to your request, you may appeal to the Department of Administration, , by following the instructions contained in your determination letter. You must appeal the determination within 70 days from the date of the determination letter at [address], [address], [place], [place] [address], or [address], [place] [address]. If you disagree with the  decision, you may appeal to the Department of Administration, . You must file your appeal within 30 days from the date of the  decision letter at [address], [address], [place], [place] [address], or [address], [address], [place], [place] [address]. If you disagree with a decision of an , you may file a petition for judicial review with the District Court. You must do so within 30 days of the Appeal Officer’s decision. You may be represented by an  at your own expense or you may contact the [facility] for possible representation.    [place]  for Injured Workers (NAIW): If you disagree with a  decision, you may request that NAIW represent you without charge at an  Hearing. For information regarding denial of benefits, you may contact the [facility] at: [address], [address], [place] [address], [phone], or [address], [place] [address], [phone]    To File a Complaint with the Division: If you wish to file a complaint with the  of the Division of Industrial Relations (DIR),  please contact the Workers’ Compensation Section, [address], [address], [place], [place] [address], telephone (301) [phone], or [address], [address], [place], [place] [address], telephone [phone].    For assistance with Workers’ Compensation Issues: You may contact the [facility] Office for Consumer Health Assistance, [address], [place] [address], Toll Free 1-775.455.3552, Web site: [email or web address]/MADDY E-mail: [email or web address]              __________________________________________________________________                                    _________________            Employee Name / Signature                                                                                                                            Date                                                                                                                                                                                                                              D-2 (rev. 10/20)

## 2021-02-16 NOTE — LETTER
[facility] [address] [place] [address]  Phone:  [phone] - Fax:  [phone]   [facility] Progress Report and Disability Certification  Date of Service: 2/16/2021   No Show:  No  Date / Time of Next Visit: 2/19/2021@ 2:30pm [facility]   Claim Information   Patient Name: Gab Fountain  Claim Number:     Employer: [employer]  Date of Injury: 2/11/2021     Insurer / TPA: Johnnie Littlejohn  ID / SSN:     Occupation:   Diagnosis: Diagnoses of Fall, initial encounter, Lumbar pain with radiation down left leg, and Strain of lumbar region, initial encounter were pertinent to this visit.    Medical Information   Related to Industrial Injury? Yes    Subjective Complaints:  DOI: 2/11/21. Fall off ladder causing left lumbar pain.  Was climbing up a ladder to dislodge a stuck package on the conveyor belt.  As he was descending the ladder he slipped falling backwards onto his butt.  He has moderate to severe left lumbar pain radiating down into the left thigh.  Denies bowel or bladder incontinence, perianal numbness, fever, abdominal pain, chest pain.  Has tried Norco and NSAIDS with limited relief.  He did complete a full shift at work yesterday but said he was miserable from the pain.  He has a complicated past history with his lumbar region. 2009: Lumbar S1-L3 fusion.  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 2010: Surgery for hardware removal. 2011: surgery for abscess. 4 surgeries in total. Has been in chronic pain.  No second job.   Objective Findings: Vitals reviewed, well-appearing male in no apparent distress  Bilateral lumbar paraspinal muscular tenderness and swelling.  No midline tenderness however previous surgical scar noted.  Pain does extend into the bilateral SI joint left worse than right.  No obvious deformities, masses or ecchymosis seen.  Lower extremity strength and DTRs equal.  Antalgic gait noted.  Straight  leg raise positive.  Range of motion limited in all planes secondary to pain.   Pre-Existing Condition(s): 2009: Lumbar S1-L3 fusion.  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 2010: Surgery for hardware removal. 2011: surgery for abscess. 4 surgeries in total. Has been in chronic pain.   Assessment:   Initial Visit    Status: Additional Care Required  Permanent Disability:No    Plan: MedicationMedication (NOT at Work)    Diagnostics: X-ray  Comments:No acute findings    Comments:       Disability Information   Status: Released to Restricted Duty    From:  2021  Through: 2021 Restrictions are: Temporary   Physical Restrictions   Sitting:    Standing:    Stoopin hrs/day Bendin hrs/day   Squattin hrs/day Walking:    Climbin hrs/day Pushin hrs/day   Pullin hrs/day Other:    Reaching Above Shoulder (L):   Reaching Above Shoulder (R):       Reaching Below Shoulder (L):    Reaching Below Shoulder (R):      Not to exceed Weight Limits   Carrying(hrs):   Weight Limit(lb): < or = to 10 pounds Lifting(hrs):   Weight  Limit(lb): < or = to 10 pounds   Comments:      Repetitive Actions   Hands: i.e. Fine Manipulations from Grasping:     Feet: i.e. Operating Foot Controls:     Driving / Operate Machinery:     Provider Name:   Levi Hager P.A.-C. Physician Signature:  Physician Name:     Clinic Name / Location: [address] [address] Clinic Phone Number: Dept: [phone]   Appointment Time: 12:15 Pm Visit Start Time: 12:28 PM   Check-In Time:  12:18 Pm Visit Discharge Time: 1:44 PM   Original-Treating Physician or Chiropractor    Page 2-Insurer/TPA    Page 3-Employer    Page 4-Employee

## 2021-02-16 NOTE — PROGRESS NOTES
"Subjective:      Gab Fountain is a 60 y.o. adult who presents with Back Injury (NEW  DOI 2/11/2021 Lower Back - [employer])      DOI: 2/11/21. Fall off ladder causing left lumbar pain.  Was climbing up a ladder to dislodge a stuck package on the conveyor belt.  As he was descending the ladder he slipped falling backwards onto his butt.  He has moderate to severe left lumbar pain radiating down into the left thigh.  Denies bowel or bladder incontinence, perianal numbness, fever, abdominal pain, chest pain.  Has tried Norco and NSAIDS with limited relief.  He did complete a full shift at work yesterday but said he was miserable from the pain.  He has a complicated past history with his lumbar region. 2009: Lumbar S1-L3 fusion.  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 2010: Surgery for hardware removal. 2011: surgery for abscess. 4 surgeries in total. Has been in chronic pain.  No second job.     HPI    ROS    Medications, Allergies, and current problem list reviewed today in Epic     Objective:     /96 (BP Location: Left arm, Patient Position: Sitting, BP Cuff Size: Adult)   Pulse 78   Temp 36.4 °C (97.6 °F) (Temporal)   Resp 16   Ht 1.676 m (5' 6\")   Wt 70.3 kg (155 lb)   SpO2 96%   BMI 25.02 kg/m²      Physical Exam  Vitals and nursing note reviewed.   Constitutional:       General: She is not in acute distress.     Appearance: She is well-developed. She is not diaphoretic.   Skin:     General: Skin is warm and dry.   Neurological:      Mental Status: She is alert and oriented to person, place, and time.   Psychiatric:         Behavior: Behavior normal.         Thought Content: Thought content normal.         Judgment: Judgment normal.         Vitals reviewed, well-appearing male in no apparent distress  Bilateral lumbar paraspinal muscular tenderness and swelling.  No midline tenderness however previous surgical scar noted.  Pain does extend into the bilateral " SI joint left worse than right.  No obvious deformities, masses or ecchymosis seen.  Lower extremity strength and DTRs equal.  Antalgic gait noted.  Straight leg raise positive.  Range of motion limited in all planes secondary to pain.       Assessment/Plan:     IMPRESSION:     1.  Surgical change at L4-5 and L5-S1.     2.  Convex right scoliotic curvature.     3.  Multilevel degenerative disc disease and facet degeneration.      1. Fall, initial encounter  DX-LUMBAR SPINE-2 OR 3 VIEWS   2. Lumbar pain with radiation down left leg  DX-LUMBAR SPINE-2 OR 3 VIEWS   3. Strain of lumbar region, initial encounter  ketorolac (TORADOL) injection 60 mg    naproxen (NAPROSYN) 500 MG Tab    cyclobenzaprine (FLEXERIL) 10 mg Tab   4. Pre-employment drug screening  POCT 6 Panel Urine Drug Screen     Fall at work.  X-ray negative.  No red flag symptoms.  Some radicular symptoms down the left leg.  Patient does have a complicated past medical history of his lumbar spine including 4 surgeries.  No follow-up in 3 days.  Light duty.  Toradol injection in clinic, monitored for 15 minutes, no adverse reaction  OTC meds and conservative measures as discussed    Return to clinic or go to ED if symptoms worsen or persist. Indications for ED discussed at length. Patient voices understanding. Red flags discussed.All side effects of medication discussed including allergic response, GI upset, tendon injury, etc.    Please note that this dictation was created using voice recognition software. I have made every reasonable attempt to correct obvious errors, but I expect that there are errors of grammar and possibly content that I did not discover before finalizing the note.

## 2021-02-19 ENCOUNTER — OCCUPATIONAL MEDICINE (OUTPATIENT)
Dept: OCCUPATIONAL MEDICINE | Facility: CLINIC | Age: 61
End: 2021-02-19
Payer: COMMERCIAL

## 2021-02-19 VITALS
DIASTOLIC BLOOD PRESSURE: 110 MMHG | HEIGHT: 66 IN | SYSTOLIC BLOOD PRESSURE: 176 MMHG | HEART RATE: 100 BPM | TEMPERATURE: 97.7 F | RESPIRATION RATE: 24 BRPM | BODY MASS INDEX: 24.91 KG/M2 | OXYGEN SATURATION: 100 % | WEIGHT: 155 LBS

## 2021-02-19 DIAGNOSIS — M54.16 LUMBAR RADICULOPATHY: ICD-10-CM

## 2021-02-19 PROCEDURE — 99204 OFFICE O/P NEW MOD 45 MIN: CPT | Performed by: PREVENTIVE MEDICINE

## 2021-02-19 RX ORDER — TRAMADOL HYDROCHLORIDE 50 MG/1
50 TABLET ORAL EVERY 8 HOURS PRN
Qty: 30 TABLET | Refills: 0 | Status: SHIPPED | OUTPATIENT
Start: 2021-02-19 | End: 2021-03-05

## 2021-02-19 RX ORDER — TIZANIDINE 4 MG/1
4 TABLET ORAL
Qty: 20 TABLET | Refills: 0 | Status: SHIPPED | OUTPATIENT
Start: 2021-02-19 | End: 2021-07-09

## 2021-02-19 RX ORDER — CELECOXIB 200 MG/1
200 CAPSULE ORAL 2 TIMES DAILY
Qty: 40 CAPSULE | Refills: 0 | Status: SHIPPED | OUTPATIENT
Start: 2021-02-19 | End: 2021-03-11

## 2021-02-19 NOTE — LETTER
[address],   [address] [place] [address]  Phone:  [phone] - Fax:  [phone]   Occupational Health [facility] Progress Report and Disability Certification  Date of Service: 2/19/2021   No Show:  No  Date / Time of Next Visit: 3/11/2021@ 8 AM   Claim Information   Patient Name: Gab Fountain  Claim Number:     Employer: [employer]  Date of Injury: 2/11/2021     Insurer / TPA: Johnnie Littlejohn  ID / SSN:     Occupation:   Diagnosis: The encounter diagnosis was Lumbar radiculopathy.    Medical Information   Related to Industrial Injury? Yes    Subjective Complaints:  2/11/2021: 59 yo male presents with low back pain.  Fall off ladder causing left lumbar pain.  Was climbing up a ladder to dislodge a stuck package on the conveyor belt.  As he was descending the ladder he slipped falling backwards onto his butt.  Patient is complicated low back history with multiple procedures over the years.  Currently treated in chronic pain clinic.  Seen in urgent care, given Toradol, muscle relaxers and x-rays which were negative for acute findings.  Patient states that back pain is the same.  Pain is most on the left side with radiating pain down the left leg.  He also notes some urinary incontinence, states this is new she has had with prior back injuries 2.  He states that Toradol, Naprosyn, Celebrex, tramadol prednisone and other muscle relaxers do not seem to help much.  He states he usually just gets benefit from hydrocodone.  He was in the care of physiatry before has not been seen by anybody for a while.  He states he is working light duty but has difficulty standing for prolonged periods of time due to the pain.   Objective Findings: Lumbar: No gross deformity.  Tenderness over the left paraspinal musculature and SI joint.  Range of motion diminished with flexion to less than 25 degrees.  Rotation diminished less than 25 degrees bilaterally.   Antalgic gait.   Pre-Existing Condition(s):     Assessment:   Condition Same    Status: Additional Care Required  Permanent Disability:No    Plan:      Diagnostics:      Comments:  Given complicated history will refer to physiatry  Placed referral to physical therapy  Prescribed Celebrex, tramadol and tizanidine  Restricted duty  Follow-up 3 weeks if not seen by physiatry    Disability Information   Status: Released to Restricted Duty    From:  2/19/2021  Through: 3/11/2021 Restrictions are: Temporary   Physical Restrictions   Sitting:    Standing:  < or = to 4 hrs/day Stooping:  < or = to 1 hr/day Bending:  < or = to 1 hr/day   Squatting:    Walking:  < or = to 4 hrs/day Climbing:    Pushing:      Pulling:    Other:    Reaching Above Shoulder (L):   Reaching Above Shoulder (R):       Reaching Below Shoulder (L):    Reaching Below Shoulder (R):      Not to exceed Weight Limits   Carrying(hrs):   Weight Limit(lb): < or = to 10 pounds Lifting(hrs):   Weight  Limit(lb): < or = to 10 pounds   Comments:      Repetitive Actions   Hands: i.e. Fine Manipulations from Grasping:     Feet: i.e. Operating Foot Controls:     Driving / Operate Machinery:     Provider Name:   Peyman Bourgeois D.O. Physician Signature:  Physician Name:     Clinic Name / Location: [address],   [address] [place] [address] [place] [address] Clinic Phone Number: Dept: [phone]   Appointment Time: 2:30 Pm Visit Start Time: 2:13 PM   Check-In Time:  2:12 Pm Visit Discharge Time: 2:45 PM   Original-Treating Physician or Chiropractor    Page 2-Insurer/TPA    Page 3-Employer    Page 4-Employee

## 2021-02-19 NOTE — PROGRESS NOTES
"Subjective:     Gab Fountain is a 60 y.o. adult who presents for Follow-Up ([employer] DOI 2/11/21 lower back, same, rm 16)      2/11/2021: 61 yo male presents with low back pain.  Fall off ladder causing left lumbar pain.  Was climbing up a ladder to dislodge a stuck package on the conveyor belt.  As he was descending the ladder he slipped falling backwards onto his butt.  Patient is complicated low back history with multiple procedures over the years.  Currently treated in chronic pain clinic.  Seen in urgent care, given Toradol, muscle relaxers and x-rays which were negative for acute findings.  Patient states that back pain is the same.  Pain is most on the left side with radiating pain down the left leg.  He also notes some urinary incontinence, states this is new she has had with prior back injuries 2.  He states that Toradol, Naprosyn, Celebrex, tramadol prednisone and other muscle relaxers do not seem to help much.  He states he usually just gets benefit from hydrocodone.  He was in the care of physiatry before has not been seen by anybody for a while.  He states he is working light duty but has difficulty standing for prolonged periods of time due to the pain.    ROS: All systems were reviewed on intake form, form was reviewed and signed. See scanned documents in media. Pertinent positives and negatives included in HPI.    PMH: 4 prior lumbar procedures in the early 2010s, was on chronic opiate pain management up until a year or 2 ago  MEDS: Medications were reviewed in Epic  ALLERGIES: No Known Allergies  SOCHX: Works as  at [employer]  FH: No pertinent family history to this problem       Objective:     BP (!) 176/110   Pulse 100   Temp 36.5 °C (97.7 °F)   Resp (!) 24   Ht 1.676 m (5' 6\")   Wt 70.3 kg (155 lb)   SpO2 100%   BMI 25.02 kg/m²     Constitutional: Patient is in no acute distress. Appears well-developed and well-nourished.   HENT: Normocephalic and " atraumatic. EOM are normal. No scleral icterus.   Cardiovascular: Normal rate.    Pulmonary/Chest: Effort normal. No respiratory distress.   Neurological: Patient is alert and oriented to person, place, and time.   Skin: Skin is warm and dry.   Psychiatric: Normal mood and affect. Behavior is normal.     Lumbar: No gross deformity.  Tenderness over the left paraspinal musculature and SI joint.  Range of motion diminished with flexion to less than 25 degrees.  Rotation diminished less than 25 degrees bilaterally.  Antalgic gait.    Assessment/Plan:       1. Lumbar radiculopathy  - tizanidine (ZANAFLEX) 4 MG Tab; Take 1 tablet by mouth at bedtime as needed.  Dispense: 20 tablet; Refill: 0  - traMADol (ULTRAM) 50 MG Tab; Take 1 tablet by mouth every 8 hours as needed for up to 14 days.  Dispense: 30 tablet; Refill: 0  - celecoxib (CELEBREX) 200 MG Cap; Take 1 capsule by mouth 2 times a day for 20 days.  Dispense: 40 capsule; Refill: 0  - REFERRAL TO PHYSICAL THERAPY  - REFERRAL TO OUTPATIENT INTERVENTIONAL PAIN CLINIC    Released to Restricted Duty FROM 2/19/2021 TO 3/11/2021     Given complicated history will refer to physiatry  Placed referral to physical therapy  Prescribed Celebrex, tramadol and tizanidine  Restricted duty  Follow-up 3 weeks if not seen by physiatry    Differential diagnosis, natural history, supportive care, and indications for immediate follow-up discussed.    Approximately 45 minutes were spent in reviewing notes, preparing for visit, obtaining history, exam and evaluation, patient counseling/education and post visit documentation/orders.

## 2021-02-24 ENCOUNTER — NON-PROVIDER VISIT (OUTPATIENT)
Dept: OCCUPATIONAL MEDICINE | Facility: CLINIC | Age: 61
End: 2021-02-24
Payer: COMMERCIAL

## 2021-02-24 DIAGNOSIS — Z02.83 ENCOUNTER FOR DRUG SCREENING: ICD-10-CM

## 2021-02-24 PROCEDURE — 8911 PR MRO FEE: Performed by: PREVENTIVE MEDICINE

## 2021-03-19 ENCOUNTER — APPOINTMENT (OUTPATIENT)
Dept: URGENT CARE | Facility: CLINIC | Age: 61
End: 2021-03-19

## 2021-03-19 ENCOUNTER — OCCUPATIONAL MEDICINE (OUTPATIENT)
Dept: URGENT CARE | Facility: CLINIC | Age: 61
End: 2021-03-19
Payer: COMMERCIAL

## 2021-03-19 VITALS
SYSTOLIC BLOOD PRESSURE: 200 MMHG | BODY MASS INDEX: 26.59 KG/M2 | TEMPERATURE: 98.4 F | RESPIRATION RATE: 18 BRPM | WEIGHT: 159.6 LBS | OXYGEN SATURATION: 97 % | DIASTOLIC BLOOD PRESSURE: 104 MMHG | HEIGHT: 65 IN | HEART RATE: 95 BPM

## 2021-03-19 DIAGNOSIS — M79.605 LUMBAR PAIN WITH RADIATION DOWN LEFT LEG: ICD-10-CM

## 2021-03-19 DIAGNOSIS — R03.0 ELEVATED BLOOD PRESSURE READING: ICD-10-CM

## 2021-03-19 DIAGNOSIS — M54.50 LUMBAR PAIN WITH RADIATION DOWN LEFT LEG: ICD-10-CM
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
## 2021-03-19 PROCEDURE — 99213 OFFICE O/P EST LOW 20 MIN: CPT | Performed by: PHYSICIAN ASSISTANT

## 2021-03-19 ASSESSMENT — ENCOUNTER SYMPTOMS
FALLS: 1
BACK PAIN: 1
MYALGIAS: 1

## 2021-03-19 NOTE — PROGRESS NOTES
Subjective:   Gab Fountain is a 60 y.o. adult who presents for Back Injury ( Follow- up, 02/11/2021)       Mr. Fountain presents for reevaluation of low back pain today.  He states that his physical therapist advised him that he should not be working at all and he is requesting modification of work restrictions.  Pain is predominantly on the left side and radiating to the right side.  Minimal activity aggravates symptoms.  He has been taking Celebrex and tramadol for symptoms.  These provide some relief.  He is scheduled with physiatry on 29 March.    Review of Systems   Musculoskeletal: Positive for back pain, falls and myalgias.       PMH:  has a past medical history of Infectious disease (2010).  MEDS:   Current Outpatient Medications:   •  ascorbic acid (VITAMIN C) 500 MG tablet, Take 1 Tab by mouth 2 Times a Day., Disp: 30 Each, Rfl: 5  •  magnesium oxide (MAG-OX) 400 MG TABS, Take 1 Tab by mouth 2 Times a Day., Disp: 30 Each, Rfl: 5  •  multivitamin (THERAGRAN) TABS, Take 1 Tab by mouth every day., Disp: 30 Each, Rfl: 5  •  therapeutic multivitamin-minerals (THERAGRAN-M) TABS, Take 1 Tab by mouth every day., Disp: 30 Each, Rfl: 5  •  tizanidine (ZANAFLEX) 4 MG Tab, Take 1 tablet by mouth at bedtime as needed., Disp: 20 tablet, Rfl: 0  •  naproxen (NAPROSYN) 500 MG Tab, Take 1 tablet by mouth 2 times a day, with meals., Disp: 30 tablet, Rfl: 0  •  cyclobenzaprine (FLEXERIL) 10 mg Tab, Take 1 tablet by mouth 3 times a day as needed., Disp: 20 tablet, Rfl: 0  •  prochlorperazine (COMPAZINE) 10 MG TABS, Take 10 mg by mouth every 6 hours as needed., Disp: , Rfl:   ALLERGIES: No Known Allergies  SURGHX:   Past Surgical History:   Procedure Laterality Date   • INCISION AND DRAINAGE ORTHOPEDIC  5/27/2011    Procedure: LUMBAR;  Surgeon: Chaim Barber M.D.;  Location: SURGERY [facility];  Service:    • LUMBAR EXPLORATION  5/27/2011    Procedure: REMOVAL HARDWARE;  Surgeon: Chaim Barber M.D.;  Location:  "SURGERY [facility];  Service:    • LUMBAR FUSION POSTERIOR  5/27/2011    Procedure: L4-5 W/ALLOGRAFT ;  Surgeon: Reggie Troy M.D.;  Location: [facility];  Service:    • INCISION AND DRAINAGE GENERAL  5/19/2011    Performed by MARY ELLEN BARRAGAN at SURGERY [facility]   • HARDWARE REMOVAL NEURO  5/19/2011    Performed by REGGIE TROY at [facility]   • OTHER      spinal fusion L1 S1 not sure  4/8/2010     SOCHX:  reports that she has never smoked. She has never used smokeless tobacco. She reports current alcohol use. She reports that she does not use drugs.  FH: Family history was reviewed, no pertinent findings to report   Objective:   BP (!) 200/104 (BP Location: Left arm, Patient Position: Sitting, BP Cuff Size: Adult long)   Pulse 95   Temp 36.9 °C (98.4 °F) (Temporal)   Resp 18   Ht 1.651 m (5' 5\")   Wt 72.4 kg (159 lb 9.6 oz)   SpO2 97%   BMI 26.56 kg/m²   Physical Exam  Vitals reviewed.   Constitutional:       General: She is not in acute distress.     Appearance: Normal appearance. She is well-developed. She is not toxic-appearing.   HENT:      Head: Normocephalic and atraumatic.      Right Ear: External ear normal.      Left Ear: External ear normal.      Nose: Nose normal.   Eyes:      General: Gaze aligned appropriately.   Cardiovascular:      Rate and Rhythm: Normal rate and regular rhythm.   Pulmonary:      Effort: Pulmonary effort is normal. No respiratory distress.      Breath sounds: No stridor.   Musculoskeletal:      Cervical back: Neck supple.   Skin:     General: Skin is warm and dry.      Capillary Refill: Capillary refill takes less than 2 seconds.   Neurological:      Mental Status: She is alert and oriented to person, place, and time.      Comments: CN2-12 grossly intact   Psychiatric:         Speech: Speech normal.         Behavior: Behavior normal.            Assessment/Plan:   1. Lumbar pain with radiation down left leg    2. Fall, initial " encounter    3. Elevated blood pressure reading    1.  Patient's work restrictions adjusted to sedentary work only.  He will be out of town for the next week and not working.  I would like him to follow-up with occupational health immediately upon his return.  He has appointment with physiatry on the 29th.  He will see occupational health on the 30th at 8:30 AM for reevaluation.    2.  Patient advised that his blood pressure is significantly elevated today.  Prior records reviewed and it appears that he tends to have significantly elevated blood pressure.  Could be related to his back pain but I suspect that he has underlying hypertension as well.  Patient indicates that he has been on antihypertensives before.  We cannot treat this as part of this appointment but we would be happy to see him in urgent care to address this if he would like. He declines appt today. Otherwise I recommend he follow-up with primary for reevaluation and further management.    Differential diagnosis, natural history, supportive care, and indications for immediate follow-up discussed.

## 2021-03-19 NOTE — LETTER
[facility] [place] [id number] [facility] [address] [place] [address]  Phone:  [phone] - Fax:  [phone]   [facility] Progress Report and Disability Certification  Date of Service: 3/19/2021   No Show:  No  Date / Time of Next Visit:3/30/2021 @8:30am [facility]   Claim Information   Patient Name: Gab Fountain  Claim Number:     Employer: [employer]  Date of Injury: 2/11/2021     Insurer / TPA: Johnnie Little[employer]  ID / SSN:     Occupation:   Diagnosis: Diagnoses of Lumbar pain with radiation down left leg, Fall, initial encounter, and Elevated blood pressure reading were pertinent to this visit.    Medical Information   Related to Industrial Injury?      Subjective Complaints:  Mr. Fountain presents for reevaluation of low back pain today.  He states that his physical therapist advised him that he should not be working at all and he is requesting modification of work restrictions.  Pain is predominantly on the left side and radiating to the right side.  Minimal activity aggravates symptoms.  He has been taking Celebrex and tramadol for symptoms.  These provide some relief.  He is scheduled with physiatry on 29 March.   Objective Findings: Physical Exam  Vitals reviewed.   Constitutional:       General: She is not in acute distress.     Appearance: Normal appearance. She is well-developed. She is not toxic-appearing.   HENT:      Head: Normocephalic and atraumatic.      Right Ear: External ear normal.      Left Ear: External ear normal.      Nose: Nose normal.   Eyes:      General: Gaze aligned appropriately.   Cardiovascular:      Rate and Rhythm: Normal rate and regular rhythm.   Pulmonary:      Effort: Pulmonary effort is normal. No respiratory distress.      Breath sounds: No stridor.   Musculoskeletal:      Cervical back: Neck supple.   Skin:     General: Skin is warm and dry.      Capillary Refill: Capillary refill takes less than 2 seconds.   Neurological:         Mental Status: She is alert and oriented to person, place, and time.      Comments: CN2-12 grossly intact   Psychiatric:         Speech: Speech normal.         Behavior: Behavior normal.    Pre-Existing Condition(s):     Assessment:   Condition Worsened    Status: Additional Care Required  Permanent Disability:No    Plan:      Diagnostics:      Comments:  I will increase patient's work restrictions today, as he states that symptoms are not improving with current restrictions.  I do not have physical therapy notes for review.  I think it most appropriate that he is reevaluated at occupational medicine   as soon as possible, as they have been directly managing his care.  Follow-up with physical therapy and physiatry as scheduled.  Follow-up appointment made for patient on  at 8:30 AM with occupational medicine.    Disability Information   Status:      From:  3/19/2021  Through: 3/30/2021 Restrictions are: Temporary   Physical Restrictions   Sitting:    Standing:  < or = to 1 hr/day [place] hrs/day [place] hrs/day   Squatting:    Walking:  < or = to 1 hr/day [place] hrs/day Pushing:      Pullin hrs/day Other:    Reaching Above Shoulder (L):   Reaching Above Shoulder (R):       Reaching Below Shoulder (L):    Reaching Below Shoulder (R):      Not to exceed Weight Limits   Carrying(hrs):   Weight Limit(lb): < or = to 10 pounds Lifting(hrs):   Weight  Limit(lb): < or = to 10 pounds   Comments: Sedentary work only.    Repetitive Actions   Hands: i.e. Fine Manipulations from Grasping:     Feet: i.e. Operating Foot Controls:     Driving / Operate Machinery:     Provider Name:   Luis Weber P.A.-C. Physician Signature:  Physician Name:     Clinic Name / Location: [place] [address]  [place] [address] Clinic Phone Number: Dept: [phone]   Appointment Time: 1:30 Pm Visit Start Time: 1:45 PM   Check-In Time:  1:15 Pm Visit Discharge Time: 2:40 PM    Original-Treating Physician or Chiropractor    Page 2-Insurer/TPA    Page 3-Employer    Page 4-Employee

## 2021-07-08 ENCOUNTER — TELEPHONE (OUTPATIENT)
Dept: SCHEDULING | Facility: IMAGING CENTER | Age: 61
End: 2021-07-08

## 2021-07-08 ENCOUNTER — OFFICE VISIT (OUTPATIENT)
Dept: URGENT CARE | Facility: CLINIC | Age: 61
End: 2021-07-08

## 2021-07-08 VITALS
WEIGHT: 159.6 LBS | BODY MASS INDEX: 25.65 KG/M2 | HEIGHT: 66 IN | RESPIRATION RATE: 16 BRPM | TEMPERATURE: 98 F | OXYGEN SATURATION: 98 % | SYSTOLIC BLOOD PRESSURE: 160 MMHG | HEART RATE: 78 BPM | DIASTOLIC BLOOD PRESSURE: 108 MMHG

## 2021-07-08 DIAGNOSIS — I10 HYPERTENSION, UNSPECIFIED TYPE: ICD-10-CM

## 2021-07-08 PROCEDURE — 99213 OFFICE O/P EST LOW 20 MIN: CPT | Performed by: PHYSICIAN ASSISTANT

## 2021-07-08 ASSESSMENT — ENCOUNTER SYMPTOMS
COUGH: 0
BLURRED VISION: 0
SHORTNESS OF BREATH: 0
CHILLS: 0
DIZZINESS: 0
HEADACHES: 0
PALPITATIONS: 0
NAUSEA: 0
VOMITING: 0
FEVER: 0

## 2021-07-08 NOTE — PROGRESS NOTES
"Subjective:   Gab Fountain  is a 60 y.o. adult who presents for Other (high blood pressure )      Other  This is a new problem. The current episode started more than 1 month ago. Pertinent negatives include no chest pain, chills, coughing, fever, headaches, nausea, rash or vomiting.   Patient presents urgent care noting long running history of hypertension.  Has planned Worker's Comp. back surgery around 1 month from now.  He has been requested by his surgeon to seek treatment for hypertension that has been long running.  Patient is unclear of the duration of time that he has had high blood pressure.  Review of chart demonstrates hypertension consistently through 2018 (and a long gap of care before that).  Patient notes remote history of taking lisinopril for hypertension but has not for years.  He denies chest pain or palpitations.  Denies shortness of breath complains of tinnitus.  Denies headache or visual changes.    Review of Systems   Constitutional: Negative for chills and fever.   HENT: Positive for tinnitus.    Eyes: Negative for blurred vision.   Respiratory: Negative for cough and shortness of breath.    Cardiovascular: Negative for chest pain and palpitations.   Gastrointestinal: Negative for nausea and vomiting.   Skin: Negative for rash.   Neurological: Negative for dizziness and headaches.       No Known Allergies     Objective:   /108 (BP Location: Left arm, Patient Position: Sitting, BP Cuff Size: Adult)   Pulse 78   Temp 36.7 °C (98 °F) (Temporal)   Resp 16   Ht 1.676 m (5' 6\")   Wt 72.4 kg (159 lb 9.6 oz)   SpO2 98%   BMI 25.76 kg/m²     Physical Exam  Vitals and nursing note reviewed.   Constitutional:       General: She is not in acute distress.     Appearance: She is well-developed. She is not diaphoretic.   HENT:      Head: Normocephalic and atraumatic.      Right Ear: Tympanic membrane, ear canal and external ear normal.      Left Ear: Tympanic membrane, ear canal and " external ear normal.      Nose: Nose normal.      Mouth/Throat:      Pharynx: Uvula midline. Posterior oropharyngeal erythema ( mild PND) present. No oropharyngeal exudate.      Tonsils: No tonsillar abscesses.   Eyes:      General: No scleral icterus.        Right eye: No discharge.         Left eye: No discharge.      Conjunctiva/sclera: Conjunctivae normal.   Pulmonary:      Effort: Pulmonary effort is normal. No respiratory distress.      Breath sounds: No decreased breath sounds, wheezing, rhonchi or rales.   Musculoskeletal:         General: Normal range of motion.      Cervical back: Neck supple.   Lymphadenopathy:      Cervical: No cervical adenopathy.   Skin:     General: Skin is warm and dry.      Coloration: Skin is not pale.   Neurological:      Mental Status: She is alert and oriented to person, place, and time.      Coordination: Coordination normal.         Assessment/Plan:   1. Hypertension, unspecified type  -An appointment is made for patient tomorrow with primary care provider  -We discussed the nature of hypertension and elevated blood pressure  -Follow-up with PCP    I have worn an N95 mask, gloves and eye protection for the entire encounter with this patient.     Differential diagnosis, natural history, supportive care, and indications for immediate follow-up discussed.

## 2021-07-09 ENCOUNTER — OFFICE VISIT (OUTPATIENT)
Dept: MEDICAL GROUP | Facility: PHYSICIAN GROUP | Age: 61
End: 2021-07-09

## 2021-07-09 VITALS
TEMPERATURE: 99.2 F | HEIGHT: 66 IN | DIASTOLIC BLOOD PRESSURE: 100 MMHG | HEART RATE: 84 BPM | OXYGEN SATURATION: 96 % | WEIGHT: 158 LBS | SYSTOLIC BLOOD PRESSURE: 170 MMHG | BODY MASS INDEX: 25.39 KG/M2

## 2021-07-09 DIAGNOSIS — Z13.1 SCREENING FOR DIABETES MELLITUS: ICD-10-CM

## 2021-07-09 DIAGNOSIS — I10 ESSENTIAL HYPERTENSION: ICD-10-CM

## 2021-07-09 DIAGNOSIS — Z00.00 HEALTHCARE MAINTENANCE: ICD-10-CM

## 2021-07-09 DIAGNOSIS — Z13.220 LIPID SCREENING: ICD-10-CM

## 2021-07-09 PROCEDURE — 99214 OFFICE O/P EST MOD 30 MIN: CPT | Performed by: NURSE PRACTITIONER

## 2021-07-09 RX ORDER — LISINOPRIL 10 MG/1
10 TABLET ORAL DAILY
Qty: 30 TABLET | Refills: 0 | Status: SHIPPED | OUTPATIENT
Start: 2021-07-09 | End: 2021-07-16

## 2021-07-09 ASSESSMENT — PATIENT HEALTH QUESTIONNAIRE - PHQ9: CLINICAL INTERPRETATION OF PHQ2 SCORE: 0

## 2021-07-09 NOTE — PROGRESS NOTES
Subjective  Chief Complaint  Establish care to manage his chronic conditions    History of Present Illness  Gab Fountain is a 60 y.o. male. This patient is here today to establish care.  His did not have a PCP prior to today's visit.    High blood pressure  He does follow with Dr. Rosas for his back industrial injury at work.  He is planning to have surgery, and he was informed that his blood pressure has been consistently high there.  He reports that he has not been diagnosed with high blood pressure in the past.  He reports that he was on blood pressure lowering medication in 2009, and he was on the medication for about 6 months.  It was then stopped.    He denies symptoms high BP:pounding headache, visual changes, palpitations, flushed face.     Past Medical History    Allergies: Patient has no known allergies.  Past Medical History:   Diagnosis Date   • Infectious disease 2010    MRSA     Past Surgical History:   Procedure Laterality Date   • INCISION AND DRAINAGE ORTHOPEDIC  5/27/2011    Procedure: LUMBAR;  Surgeon: Reggie Troy M.D.;  Location: SURGERY [facility];  Service:    • LUMBAR EXPLORATION  5/27/2011    Procedure: REMOVAL HARDWARE;  Surgeon: Reggie Troy M.D.;  Location: [facility];  Service:    • LUMBAR FUSION POSTERIOR  5/27/2011    Procedure: L4-5 W/ALLOGRAFT ;  Surgeon: Reggie Troy M.D.;  Location: [facility];  Service:    • INCISION AND DRAINAGE GENERAL  5/19/2011    Performed by MARY ELLEN BARRAGAN at [facility]   • HARDWARE REMOVAL NEURO  5/19/2011    Performed by REGGIE TROY at [facility]   • OTHER      spinal fusion L1 S1 not sure  4/8/2010     Current Outpatient Medications Ordered in Epic   Medication Sig Dispense Refill   • lisinopril (PRINIVIL) 10 MG Tab Take 1 tablet by mouth every day. 30 tablet 0   • naproxen (NAPROSYN) 500 MG Tab Take 1 tablet by mouth 2 times a day, with meals. 30 tablet 0   • cyclobenzaprine  "(FLEXERIL) 10 mg Tab Take 1 tablet by mouth 3 times a day as needed. 20 tablet 0     No current Epic-ordered facility-administered medications on file.     Family History:    Family History   Problem Relation Age of Onset   • COPD Mother    • Hypertension Mother    • Cancer Father         non-small cell carcinoma; prostate   • Cancer Maternal Grandmother         breast   • Diabetes Neg Hx    • Heart Disease Neg Hx    • Hyperlipidemia Neg Hx    • Stroke Neg Hx       Personal/Social History:    Social History     Tobacco Use   • Smoking status: Never Smoker   • Smokeless tobacco: Never Used   Vaping Use   • Vaping Use: Never used   Substance Use Topics   • Alcohol use: Yes     Comment: rare   • Drug use: No     Social History     Social History Narrative   • Not on file      Review of Systems:     General: Negative for fever/chills.    Eyes:  Negative for vision changes.   ENT:  Negative for hearing changes.    Respiratory:  Negative for cough and dyspnea.     Cardiovascular:  Negative for chest pain and palpitations.   Gastrointestinal:  Negative for nausea/vomiting.    Genitourinary:  Negative for dysuria.    Musculoskeletal:  Negative for myalgias.    Skin:  Negative for rash.    Neurological:  Negative for numbness/tingling.    Heme/Lymph:  Does not bruise/bleed easily.     Objective  Physical Exam:   BP (!) 170/100 (BP Location: Right arm, Patient Position: Sitting, BP Cuff Size: Adult)   Pulse 84   Temp 37.3 °C (99.2 °F) (Temporal)   Ht 1.676 m (5' 6\")   Wt 71.7 kg (158 lb)   SpO2 96%  Body mass index is 25.5 kg/m².  General:  Alert and oriented.  Well appearing.  NAD.  Head:  Normocephalic.   Eyes:  Eyes conjunctiva clear lids without ptosis.    ENT: Ears normal shape and contour.   Neck: Supple without JVD.   Pulmonary:  Normal effort.  Clear to ausculation without rales, ronchi, or wheezing.  Cardiovascular:  Regular rate and rhythm without murmur, rubs or gallop.  Radial pulses are intact and equal " bilaterally.  Gastrointestinal: Abdomen soft, nontender, nondistended. Normal bowel sounds. Liver and spleen are not palpable.  Musculoskeletal:  No extremity cyanosis, clubbing, or edema.  Skin:  Warm and dry.  No obvious lesions.  Neurologic: Grossly intact.  Sensation intact.   Psych: Normal mood and affect. Alert and oriented x3. Judgment and insight is normal.    Assessment/Plan   1. Essential hypertension  This is a new condition.   Start lisinopril daily - stressed importance of taking it every day.  He will return to clinic for MA visit for blood pressure check in two weeks as he is self pay; adjustment of medication as needed.  BP reading may be high due to pain.  Advised 81 mg ASA.   Due for labs, including metabolic panel and lipid screen-he will find self pay option for lab.  - lisinopril (PRINIVIL) 10 MG Tab; Take 1 tablet by mouth every day.  Dispense: 30 tablet; Refill: 0  - HEMOGLOBIN A1C; Future  - Comp Metabolic Panel; Future  - Lipid Profile; Future    2. Healthcare maintenance    3. Screening for diabetes mellitus  - HEMOGLOBIN A1C; Future  - Comp Metabolic Panel; Future    4. Lipid screening  - Lipid Profile; Future    Health Maintenance: Defer due to back pain and high blood pressure; financial cost    Return in about 2 weeks (around 7/23/2021), or if symptoms worsen or fail to improve, for follow up with MA for BP check (Tuesday thru Friday).    Patient verbalized understanding and agreed to plan of care.  We have discussed to contact me with needs via [facility] or by phone if needed.      I have placed the above orders and discussed them with an approved delegating provider.  The MA is performing the below orders under the direction of Dr. Scott.    Please note that this dictation was created using voice recognition software. I have made every reasonable attempt to correct obvious errors, but I expect that there are errors of grammar and possibly content that I did not discover before  finalizing the note.    DEB Simmons  [facility]

## 2021-07-14 ENCOUNTER — TELEPHONE (OUTPATIENT)
Dept: MEDICAL GROUP | Facility: PHYSICIAN GROUP | Age: 61
End: 2021-07-14

## 2021-07-14 NOTE — TELEPHONE ENCOUNTER
Phone Number Called: [phone]    Call outcome: Spoke to patient regarding message below.    Message: Called to follow up with patient regarding his phone call yesterday regarding his blood pressure. Patient states his blood pressure was still elevated. Patient was told by Tiffany to double his dose of Lisinopril. Patient phone  and he was unable to ask how he was supposed to take his blood pressure at home. Patient wanting to know if he should take 2 tabs in the morning or one at night and one in the morning. Advised patient to take 2 tabs in the morning. Patient will come in on Friday Morning for an MA appointment to get his blood pressure checked.

## 2021-07-16 ENCOUNTER — NON-PROVIDER VISIT (OUTPATIENT)
Dept: MEDICAL GROUP | Facility: PHYSICIAN GROUP | Age: 61
End: 2021-07-16

## 2021-07-16 VITALS — SYSTOLIC BLOOD PRESSURE: 168 MMHG | DIASTOLIC BLOOD PRESSURE: 118 MMHG

## 2021-07-16 DIAGNOSIS — I10 BENIGN ESSENTIAL HTN: ICD-10-CM

## 2021-07-16 PROCEDURE — 99999 PR NO CHARGE: CPT | Performed by: FAMILY MEDICINE

## 2021-07-16 RX ORDER — LISINOPRIL 20 MG/1
20 TABLET ORAL DAILY
Qty: 30 TABLET | Refills: 5 | Status: SHIPPED | OUTPATIENT
Start: 2021-07-16 | End: 2021-07-26

## 2021-07-16 NOTE — PROGRESS NOTES
Gab Fountain is a 60 y.o. male here for a non-provider visit for BP Reading    Vitals:    07/16/21 1137 07/16/21 1141   BP: (!) 170/110 (!) 168/118   BP Location: Left arm Right arm   Patient Position: Sitting Sitting   BP Cuff Size: Adult Adult     If abnormal, was the Registered Nurse (office provider if RN is unavailable) notified today? Yes    Routed to PCP? Yes

## 2021-07-26 ENCOUNTER — NON-PROVIDER VISIT (OUTPATIENT)
Dept: MEDICAL GROUP | Facility: PHYSICIAN GROUP | Age: 61
End: 2021-07-26

## 2021-07-26 VITALS — DIASTOLIC BLOOD PRESSURE: 98 MMHG | SYSTOLIC BLOOD PRESSURE: 180 MMHG

## 2021-07-26 DIAGNOSIS — I10 ESSENTIAL HYPERTENSION: ICD-10-CM

## 2021-07-26 RX ORDER — LISINOPRIL 40 MG/1
40 TABLET ORAL DAILY
Qty: 90 TABLET | Refills: 1 | Status: SHIPPED | OUTPATIENT
Start: 2021-07-26 | End: 2021-07-28

## 2021-07-28 ENCOUNTER — TELEPHONE (OUTPATIENT)
Dept: MEDICAL GROUP | Facility: PHYSICIAN GROUP | Age: 61
End: 2021-07-28

## 2021-07-28 DIAGNOSIS — I10 ESSENTIAL HYPERTENSION: ICD-10-CM

## 2021-07-28 RX ORDER — METOPROLOL SUCCINATE 50 MG/1
50 TABLET, EXTENDED RELEASE ORAL DAILY
Qty: 30 TABLET | Refills: 0 | Status: SHIPPED | OUTPATIENT
Start: 2021-07-28 | End: 2021-08-06

## 2021-07-28 NOTE — PROGRESS NOTES
1. Essential hypertension  TC from patient that he is experiencing tinnitus due to lisinopril.  Will switch to BB.  Patient to follow up for blood pressure check in 1 week.   - metoprolol SR (TOPROL XL) 50 MG TABLET SR 24 HR; Take 1 tablet by mouth every day.  Dispense: 30 tablet; Refill: 0

## 2021-07-28 NOTE — TELEPHONE ENCOUNTER
Patient called today in regards to his blood pressure medication, lisinopril, he mentioned that he spoke with his nurse  in regards to having ringing in his ears and that she had mentioned taking a beta blocker or something else. Please advise, thank you

## 2021-08-03 ENCOUNTER — PATIENT MESSAGE (OUTPATIENT)
Dept: MEDICAL GROUP | Facility: PHYSICIAN GROUP | Age: 61
End: 2021-08-03

## 2021-08-03 NOTE — PATIENT COMMUNICATION
Phone Number Called: [phone]    Call outcome: Spoke to patient regarding message below.    Message: Called to inform patient that Tiffany is recommending a visit to discuss patient concerns regarding metoprolol. Patient is stiff with a headache and ringing in his ears is still there. He is having joint stiffness and feeling more fatigued. Patient discussing cost with this RN as he is self pay right now.

## 2021-08-03 NOTE — TELEPHONE ENCOUNTER
Regarding: FW: Blood Pressure Follow up  Please have patient make an appointment for further evaluation of his concerns with his blood pressure medication.     Thank you!  Tiffany   ----- Message -----  From: Mack Wolff Ass't  Sent: 8/3/2021  10:31 AM PDT  To: UYEN Simmons  Subject: Blood Pressure Follow up                         ----- Message from Mack Wolff Ass't sent at 8/3/2021 10:31 AM PDT -----  Patient called today stating that on the new medication, metoprolol, he is experiencing symptoms like stiffness of the hands and joints, feeling tired, feels swollen, and has headaches. Please advise, thank you      ----- Message sent from Rebecca Lara R.N. to Gab Fountain at 8/3/2021 10:14 AM -----   Shane De Guzman,    In an effort to provide quality care, we would like to take the opportunity to follow-up on your blood pressure on behalf of your PCP.  We noticed that your blood pressure was elevated at your last visit.    Home Monitoring:  If you have a home blood pressure monitor, could you please take your blood pressure and reply back to this message with the results?    Tips for taking an accurate blood pressure:   If you are currently taking blood pressure medication, make sure you have taken it at least 1 hour prior to your blood pressure reading.    1. Ensure the blood pressure cuff is placed on your bare arm with no clothing underneath.   2. Sit in an upright position with both of your feet flat on the floor (do not cross legs/ankles).   3. Arm is supported at the heart level (resting on a table/arm chair) .  4. Make sure the bottom of blood pressure cuff is just above the bend of the arm.   5. Rest for 5 minutes before starting the blood pressure reading.  6. Try not to talk/move around while the blood pressure machine is measuring.     Unable to Monitor at Home:  If you do not have the ability to monitor your blood pressure at home, please let me know. We would like to have  you come by the office for a quick visit with a Registered Nurse or Medical Assistant to check your blood pressure.     Thank you so much and we hope to hear from you soon!  Sincerely,  Rebecca Lara R.N.

## 2021-08-06 ENCOUNTER — OFFICE VISIT (OUTPATIENT)
Dept: MEDICAL GROUP | Facility: PHYSICIAN GROUP | Age: 61
End: 2021-08-06

## 2021-08-06 VITALS
OXYGEN SATURATION: 97 % | WEIGHT: 166 LBS | HEART RATE: 63 BPM | HEIGHT: 66 IN | BODY MASS INDEX: 26.68 KG/M2 | TEMPERATURE: 98.4 F | SYSTOLIC BLOOD PRESSURE: 164 MMHG | RESPIRATION RATE: 12 BRPM | DIASTOLIC BLOOD PRESSURE: 100 MMHG

## 2021-08-06 DIAGNOSIS — I10 ESSENTIAL HYPERTENSION: ICD-10-CM

## 2021-08-06 PROCEDURE — 99214 OFFICE O/P EST MOD 30 MIN: CPT | Performed by: NURSE PRACTITIONER

## 2021-08-06 RX ORDER — OXYCODONE HYDROCHLORIDE AND ACETAMINOPHEN 5; 325 MG/1; MG/1
TABLET ORAL
COMMUNITY
Start: 2021-06-30 | End: 2023-01-24

## 2021-08-06 RX ORDER — LOSARTAN POTASSIUM 100 MG/1
100 TABLET ORAL DAILY
Qty: 30 TABLET | Refills: 0 | Status: SHIPPED | OUTPATIENT
Start: 2021-08-06 | End: 2021-12-16

## 2021-08-06 RX ORDER — HYDROCHLOROTHIAZIDE 25 MG/1
25 TABLET ORAL DAILY
Qty: 30 TABLET | Refills: 0 | Status: SHIPPED | OUTPATIENT
Start: 2021-08-06 | End: 2021-12-16

## 2021-08-06 RX ORDER — METHYLPREDNISOLONE 4 MG/1
TABLET ORAL
COMMUNITY
Start: 2021-06-30 | End: 2021-08-06

## 2021-08-06 RX ORDER — HYDROCODONE BITARTRATE AND ACETAMINOPHEN 5; 325 MG/1; MG/1
TABLET ORAL
COMMUNITY
Start: 2021-06-15 | End: 2021-08-06

## 2021-08-06 NOTE — PROGRESS NOTES
"Subjective  Chief Complaint  Chief Complaint   Patient presents with   • Follow-Up     History of Present Illness  Gab Fountain is a 60 y.o. male. This established patient is here today discuss the following:    Essential hypertension  This is a chronic condition.  Blood pressure shows improvement; however, he does have side effects with medications.   Tried:  Lisinopril (worsening tinnitus), metoprolol (swelling/tightness, pounding headaches, dizziness, nausea, fatigue)  He denies light-headed, tunnel-vision, visual changes,  flushed face, chest pain.    Past Medical History    Allergies: Ace inhibitors  Past Medical History:   Diagnosis Date   • Infectious disease 2010    MRSA     Current Outpatient Medications Ordered in Epic   Medication Sig Dispense Refill   • oxyCODONE-acetaminophen (PERCOCET) 5-325 MG Tab      • losartan (COZAAR) 100 MG Tab Take 1 tablet by mouth every day. 30 tablet 0   • hydroCHLOROthiazide (HYDRODIURIL) 25 MG Tab Take 1 tablet by mouth every day. 30 tablet 0     No current Epic-ordered facility-administered medications on file.     Review of Systems:   See HPI.      Objective  Physical Exam:   BP (!) 164/100 (BP Location: Right arm, Patient Position: Sitting, BP Cuff Size: Adult)   Pulse 63   Temp 36.9 °C (98.4 °F) (Temporal)   Ht 1.676 m (5' 6\")   Wt 75.3 kg (166 lb)   SpO2 97%  Body mass index is 26.79 kg/m².  General:  Alert and oriented.  Well appearing.  NAD  Neck: Supple without JVD. No lymphadenopathy.  Pulmonary:  Normal effort.  Clear to ausculation without rales, ronchi, or wheezing.  Cardiovascular:  Regular rate and rhythm without murmur, rubs or gallop.   Skin:  Warm and dry.  No obvious lesions.  Musculoskeletal:  No extremity cyanosis, clubbing, or edema.    Assessment/Plan  1. Essential hypertension  This is a chronic condition, not controlled.   Discussed medication side effects.   Reinforced symptoms of high and low blood pressure.   ER precautions advised. "   Labs previously ordered at last visit.  May need further imaging should blood pressure remain resistant.  - losartan (COZAAR) 100 MG Tab; Take 1 tablet by mouth every day.  Dispense: 30 tablet; Refill: 0  - hydroCHLOROthiazide (HYDRODIURIL) 25 MG Tab; Take 1 tablet by mouth every day.  Dispense: 30 tablet; Refill: 0    Health Maintenance: Completed    Return in about 2 weeks (around 8/20/2021), or if symptoms worsen or fail to improve.    Patient verbalized understanding and agreed to plan of care.  We have discussed to contact me with needs via [email or web address] or by phone if needed.      I have placed the above orders and discussed them with an approved delegating provider.  The MA is performing the above orders under the direction of Dr. Doll.    Please note that this dictation was created using voice recognition software. I have made every reasonable attempt to correct obvious errors, but I expect that there are errors of grammar and possibly content that I did not discover before finalizing the note.    DEB Simmons  [facility]

## 2021-08-06 NOTE — ASSESSMENT & PLAN NOTE
This is a chronic condition.  Blood pressure shows improvement; however, he does have side effects with medications.   Tried:  Lisinopril (worsening tinnitus), metoprolol (swelling/tightness, pounding headaches, dizziness, nausea, fatigue)  He denies light-headed, tunnel-vision, visual changes,  flushed face, chest pain.

## 2021-08-06 NOTE — PATIENT INSTRUCTIONS
"Managing Your Hypertension  Hypertension is commonly called high blood pressure. This is when the force of your blood pressing against the walls of your arteries is too strong. Arteries are blood vessels that carry blood from your heart throughout your body. Hypertension forces the heart to work harder to pump blood, and may cause the arteries to become narrow or stiff. Having untreated or uncontrolled hypertension can cause heart attack, stroke, kidney disease, and other problems.  What are blood pressure readings?  A blood pressure reading consists of a higher number over a lower number. Ideally, your blood pressure should be below 120/80. The first (\"top\") number is called the systolic pressure. It is a measure of the pressure in your arteries as your heart beats. The second (\"bottom\") number is called the diastolic pressure. It is a measure of the pressure in your arteries as the heart relaxes.  What does my blood pressure reading mean?  Blood pressure is classified into four stages. Based on your blood pressure reading, your health care provider may use the following stages to determine what type of treatment you need, if any. Systolic pressure and diastolic pressure are measured in a unit called mm Hg.  Normal  · Systolic pressure: below 120.  · Diastolic pressure: below 80.  Elevated  · Systolic pressure: 120-129.  · Diastolic pressure: below 80.  Hypertension stage 1  · Systolic pressure: 130-139.  · Diastolic pressure: 80-89.  Hypertension stage 2  · Systolic pressure: 140 or above.  · Diastolic pressure: 90 or above.  What health risks are associated with hypertension?  Managing your hypertension is an important responsibility. Uncontrolled hypertension can lead to:  · A heart attack.  · A stroke.  · A weakened blood vessel (aneurysm).  · Heart failure.  · Kidney damage.  · Eye damage.  · Metabolic syndrome.  · Memory and concentration problems.  What changes can I make to manage my " hypertension?  Hypertension can be managed by making lifestyle changes and possibly by taking medicines. Your health care provider will help you make a plan to bring your blood pressure within a normal range.  Eating and drinking    · Eat a diet that is high in fiber and potassium, and low in salt (sodium), added sugar, and fat. An example eating plan is called the DASH (Dietary Approaches to Stop Hypertension) diet. To eat this way:  ? Eat plenty of fresh fruits and vegetables. Try to fill half of your plate at each meal with fruits and vegetables.  ? Eat whole grains, such as whole wheat pasta, brown rice, or whole grain bread. Fill about one quarter of your plate with whole grains.  ? Eat low-fat diary products.  ? Avoid fatty cuts of meat, processed or cured meats, and poultry with skin. Fill about one quarter of your plate with lean proteins such as fish, chicken without skin, beans, eggs, and tofu.  ? Avoid premade and processed foods. These tend to be higher in sodium, added sugar, and fat.  · Reduce your daily sodium intake. Most people with hypertension should eat less than 1,500 mg of sodium a day.  · Limit alcohol intake to no more than 1 drink a day for nonpregnant women and 2 drinks a day for men. One drink equals 12 oz of beer, 5 oz of wine, or 1½ oz of hard liquor.  Lifestyle  · Work with your health care provider to maintain a healthy body weight, or to lose weight. Ask what an ideal weight is for you.  · Get at least 30 minutes of exercise that causes your heart to beat faster (aerobic exercise) most days of the week. Activities may include walking, swimming, or biking.  · Include exercise to strengthen your muscles (resistance exercise), such as weight lifting, as part of your weekly exercise routine. Try to do these types of exercises for 30 minutes at least 3 days a week.  · Do not use any products that contain nicotine or tobacco, such as cigarettes and e-cigarettes. If you need help quitting,  ask your health care provider.  · Control any long-term (chronic) conditions you have, such as high cholesterol or diabetes.  Monitoring  · Monitor your blood pressure at home as told by your health care provider. Your personal target blood pressure may vary depending on your medical conditions, your age, and other factors.  · Have your blood pressure checked regularly, as often as told by your health care provider.  Working with your health care provider  · Review all the medicines you take with your health care provider because there may be side effects or interactions.  · Talk with your health care provider about your diet, exercise habits, and other lifestyle factors that may be contributing to hypertension.  · Visit your health care provider regularly. Your health care provider can help you create and adjust your plan for managing hypertension.  Will I need medicine to control my blood pressure?  Your health care provider may prescribe medicine if lifestyle changes are not enough to get your blood pressure under control, and if:  · Your systolic blood pressure is 130 or higher.  · Your diastolic blood pressure is 80 or higher.  Take medicines only as told by your health care provider. Follow the directions carefully. Blood pressure medicines must be taken as prescribed. The medicine does not work as well when you skip doses. Skipping doses also puts you at risk for problems.  Contact a health care provider if:  · You think you are having a reaction to medicines you have taken.  · You have repeated (recurrent) headaches.  · You feel dizzy.  · You have swelling in your ankles.  · You have trouble with your vision.  Get help right away if:  · You develop a severe headache or confusion.  · You have unusual weakness or numbness, or you feel faint.  · You have severe pain in your chest or abdomen.  · You vomit repeatedly.  · You have trouble breathing.  Summary  · Hypertension is when the force of blood pumping  through your arteries is too strong. If this condition is not controlled, it may put you at risk for serious complications.  · Your personal target blood pressure may vary depending on your medical conditions, your age, and other factors. For most people, a normal blood pressure is less than 120/80.  · Hypertension is managed by lifestyle changes, medicines, or both. Lifestyle changes include weight loss, eating a healthy, low-sodium diet, exercising more, and limiting alcohol.  This information is not intended to replace advice given to you by your health care provider. Make sure you discuss any questions you have with your health care provider.  Document Released: 09/11/2013 Document Revised: 04/10/2020 Document Reviewed: 11/15/2017  Elsevier Patient Education © 2020 [employer]

## 2021-11-30 ENCOUNTER — NURSE TRIAGE (OUTPATIENT)
Dept: MEDICAL GROUP | Facility: PHYSICIAN GROUP | Age: 61
End: 2021-11-30

## 2021-11-30 NOTE — TELEPHONE ENCOUNTER
Patient denies taking the Percocet that is on patient medication list. Patient on daily aspirin, lisinopril, and hydrochlorothazide.    Reason for Disposition  • SEVERE headache or neck pain  • Spinning or tilting sensation (vertigo) present now and one or more stroke risk factors (i.e., hypertension, diabetes, prior stroke/TIA, heart attack, age over 60) (Exception: prior physician evaluation for this AND no different/worse than usual)  • Difficulty breathing  • Loss of vision or double vision    Additional Information  • Negative: Difficult to awaken or acting confused (e.g., disoriented, slurred speech)  • Negative: New neurologic deficit that is present NOW, sudden onset of ANY of the following: * Weakness of the face, arm, or leg on one side of the body * Numbness of the face, arm, or leg on one side of the body * Loss of speech or garbled speech  • Negative: Sounds like a life-threatening emergency to the triager  • Dizziness is the main symptom  • Negative: Shock suspected (e.g., cold/pale/clammy skin, too weak to stand, low BP, rapid pulse)  • Negative: Difficult to awaken or acting confused (e.g., disoriented, slurred speech)  • Negative: Fainted, and still feels dizzy afterwards  • Negative: SEVERE difficulty breathing (e.g., struggling for each breath, speaks in single words)  • Negative: Overdose (accidental or intentional) of medications  • Negative: New neurologic deficit that is present now: * Weakness of the face, arm, or leg on one side of the body * Numbness of the face, arm, or leg on one side of the body * Loss of speech or garbled speech  • Negative: Heart beating < 50 beats per minute OR > 140 beats per minute  • Negative: Sounds like a life-threatening emergency to the triager  • Negative: Chest pain  • Negative: Rectal bleeding, bloody stool, or tarry-black stool  • Negative: Vomiting is the main symptom  • Negative: Diarrhea is the main symptom  • Negative: Heat exhaustion suspected (i.e.,  "dehydration from heat exposure)  • Negative: Patient states that he/she is having an anxiety/panic attack  • Negative: SEVERE dizziness (e.g., unable to stand, requires support to walk, feels like passing out now)  • Negative: Extra heart beats OR irregular heart beating (i.e., 'palpitations')  • Negative: Drinking very little and has signs of dehydration (e.g., no urine > 12 hours, very dry mouth, very lightheaded)  • Negative: Follows bleeding (e.g., stomach, rectum, vagina) (Exception: became dizzy from sight of small amount blood)  • Negative: Patient sounds very sick or weak to the triager    Answer Assessment - Initial Assessment Questions  1. SYMPTOM: \"What is the main symptom you are concerned about?\" (e.g., weakness, numbness)      Weakness, dizziness, lethargy, blurred vision  2. ONSET: \"When did this start?\" (minutes, hours, days; while sleeping)      When woke up this morning  3. LAST NORMAL: \"When was the last time you were normal (no symptoms)?\"      Last night  4. PATTERN \"Does this come and go, or has it been constant since it started?\"  \"Is it present now?\"      Constant, mild improvement after nap  5. CARDIAC SYMPTOMS: \"Have you had any of the following symptoms: chest pain, difficulty breathing, palpitations?\"      Mild shortness of breath  6. NEUROLOGIC SYMPTOMS: \"Have you had any of the following symptoms: headache, dizziness, vision loss, double vision, changes in speech, unsteady on your feet?\"      Headache, dizziness, blurred vision, changes in speech this AM  7. OTHER SYMPTOMS: \"Do you have any other symptoms?\"      N/A  8. PREGNANCY: \"Is there any chance you are pregnant?\" \"When was your last menstrual period?\"      N/A    Protocols used: DIZZINESS-A-OH, NEUROLOGIC DEFICIT-A-OH      "

## 2022-01-01 ENCOUNTER — HOSPITAL ENCOUNTER (OUTPATIENT)
Facility: MEDICAL CENTER | Age: 62
End: 2022-01-01
Attending: ORTHOPAEDIC SURGERY | Admitting: ORTHOPAEDIC SURGERY
Payer: COMMERCIAL

## 2022-01-06 ENCOUNTER — PRE-ADMISSION TESTING (OUTPATIENT)
Dept: ADMISSIONS | Facility: MEDICAL CENTER | Age: 62
End: 2022-01-06
Attending: ORTHOPAEDIC SURGERY
Payer: COMMERCIAL

## 2022-01-06 ENCOUNTER — HOSPITAL ENCOUNTER (OUTPATIENT)
Dept: RADIOLOGY | Facility: MEDICAL CENTER | Age: 62
End: 2022-01-06
Attending: ORTHOPAEDIC SURGERY | Admitting: ORTHOPAEDIC SURGERY
Payer: COMMERCIAL

## 2022-01-06 ENCOUNTER — HOSPITAL ENCOUNTER (OUTPATIENT)
Dept: RADIOLOGY | Facility: MEDICAL CENTER | Age: 62
End: 2022-01-06
Attending: ORTHOPAEDIC SURGERY
Payer: COMMERCIAL

## 2022-01-06 VITALS — BODY MASS INDEX: 27.88 KG/M2 | HEIGHT: 65 IN | WEIGHT: 167.33 LBS

## 2022-01-06 DIAGNOSIS — Z01.812 PRE-OPERATIVE LABORATORY EXAMINATION: ICD-10-CM

## 2022-01-06 DIAGNOSIS — Z01.810 PRE-OPERATIVE CARDIOVASCULAR EXAMINATION: ICD-10-CM

## 2022-01-06 DIAGNOSIS — Z01.811 PRE-OPERATIVE RESPIRATORY EXAMINATION: ICD-10-CM

## 2022-01-06 LAB
ALBUMIN SERPL BCP-MCNC: 4.7 G/DL (ref 3.2–4.9)
ALBUMIN/GLOB SERPL: 1.4 G/DL
ALP SERPL-CCNC: 75 U/L (ref 30–99)
ALT SERPL-CCNC: 179 U/L (ref 2–50)
ANION GAP SERPL CALC-SCNC: 14 MMOL/L (ref 7–16)
APPEARANCE UR: CLEAR
APTT PPP: 33.3 SEC (ref 24.7–36)
AST SERPL-CCNC: 77 U/L (ref 12–45)
BASOPHILS # BLD AUTO: 0.2 % (ref 0–1.8)
BASOPHILS # BLD: 0.01 K/UL (ref 0–0.12)
BILIRUB SERPL-MCNC: 0.5 MG/DL (ref 0.1–1.5)
BILIRUB UR QL STRIP.AUTO: NEGATIVE
BUN SERPL-MCNC: 10 MG/DL (ref 8–22)
CALCIUM SERPL-MCNC: 9.2 MG/DL (ref 8.5–10.5)
CHLORIDE SERPL-SCNC: 101 MMOL/L (ref 96–112)
CO2 SERPL-SCNC: 20 MMOL/L (ref 20–33)
COLOR UR: YELLOW
CREAT SERPL-MCNC: 0.9 MG/DL (ref 0.5–1.4)
EKG IMPRESSION: NORMAL
EOSINOPHIL # BLD AUTO: 0.11 K/UL (ref 0–0.51)
EOSINOPHIL NFR BLD: 2.6 % (ref 0–6.9)
ERYTHROCYTE [DISTWIDTH] IN BLOOD BY AUTOMATED COUNT: 43.5 FL (ref 35.9–50)
ERYTHROCYTE [SEDIMENTATION RATE] IN BLOOD BY WESTERGREN METHOD: 4 MM/HOUR (ref 0–20)
GLOBULIN SER CALC-MCNC: 3.3 G/DL (ref 1.9–3.5)
GLUCOSE SERPL-MCNC: 92 MG/DL (ref 65–99)
GLUCOSE UR STRIP.AUTO-MCNC: NEGATIVE MG/DL
HAV IGM SERPL QL IA: ABNORMAL
HBV CORE IGM SER QL: ABNORMAL
HBV SURFACE AG SER QL: ABNORMAL
HCT VFR BLD AUTO: 48.6 % (ref 42–52)
HCV AB SER QL: REACTIVE
HGB BLD-MCNC: 17.4 G/DL (ref 14–18)
HIV 1+2 AB+HIV1 P24 AG SERPL QL IA: NORMAL
IMM GRANULOCYTES # BLD AUTO: 0.01 K/UL (ref 0–0.11)
IMM GRANULOCYTES NFR BLD AUTO: 0.2 % (ref 0–0.9)
INR PPP: 1.25 (ref 0.87–1.13)
KETONES UR STRIP.AUTO-MCNC: NEGATIVE MG/DL
LEUKOCYTE ESTERASE UR QL STRIP.AUTO: NEGATIVE
LYMPHOCYTES # BLD AUTO: 1.19 K/UL (ref 1–4.8)
LYMPHOCYTES NFR BLD: 28.5 % (ref 22–41)
MCH RBC QN AUTO: 32 PG (ref 27–33)
MCHC RBC AUTO-ENTMCNC: 35.8 G/DL (ref 33.7–35.3)
MCV RBC AUTO: 89.5 FL (ref 81.4–97.8)
MICRO URNS: NORMAL
MONOCYTES # BLD AUTO: 0.58 K/UL (ref 0–0.85)
MONOCYTES NFR BLD AUTO: 13.9 % (ref 0–13.4)
NEUTROPHILS # BLD AUTO: 2.28 K/UL (ref 1.82–7.42)
NEUTROPHILS NFR BLD: 54.6 % (ref 44–72)
NITRITE UR QL STRIP.AUTO: NEGATIVE
NRBC # BLD AUTO: 0 K/UL
NRBC BLD-RTO: 0 /100 WBC
PH UR STRIP.AUTO: 6 [PH] (ref 5–8)
PLATELET # BLD AUTO: 190 K/UL (ref 164–446)
PMV BLD AUTO: 11.6 FL (ref 9–12.9)
POTASSIUM SERPL-SCNC: 3.7 MMOL/L (ref 3.6–5.5)
PROT SERPL-MCNC: 8 G/DL (ref 6–8.2)
PROT UR QL STRIP: NEGATIVE MG/DL
PROTHROMBIN TIME: 15.4 SEC (ref 12–14.6)
RBC # BLD AUTO: 5.43 M/UL (ref 4.7–6.1)
RBC UR QL AUTO: NEGATIVE
SARS-COV-2 RNA RESP QL NAA+PROBE: DETECTED
SODIUM SERPL-SCNC: 135 MMOL/L (ref 135–145)
SP GR UR STRIP.AUTO: 1.01
SPECIMEN SOURCE: ABNORMAL
UROBILINOGEN UR STRIP.AUTO-MCNC: 0.2 MG/DL
WBC # BLD AUTO: 4.2 K/UL (ref 4.8–10.8)

## 2022-01-06 PROCEDURE — 85025 COMPLETE CBC W/AUTO DIFF WBC: CPT

## 2022-01-06 PROCEDURE — 87522 HEPATITIS C REVRS TRNSCRPJ: CPT

## 2022-01-06 PROCEDURE — 93010 ELECTROCARDIOGRAM REPORT: CPT | Performed by: INTERNAL MEDICINE

## 2022-01-06 PROCEDURE — 85652 RBC SED RATE AUTOMATED: CPT

## 2022-01-06 PROCEDURE — U0003 INFECTIOUS AGENT DETECTION BY NUCLEIC ACID (DNA OR RNA); SEVERE ACUTE RESPIRATORY SYNDROME CORONAVIRUS 2 (SARS-COV-2) (CORONAVIRUS DISEASE [COVID-19]), AMPLIFIED PROBE TECHNIQUE, MAKING USE OF HIGH THROUGHPUT TECHNOLOGIES AS DESCRIBED BY CMS-2020-01-R: HCPCS

## 2022-01-06 PROCEDURE — C9803 HOPD COVID-19 SPEC COLLECT: HCPCS

## 2022-01-06 PROCEDURE — U0005 INFEC AGEN DETEC AMPLI PROBE: HCPCS

## 2022-01-06 PROCEDURE — 93005 ELECTROCARDIOGRAM TRACING: CPT

## 2022-01-06 PROCEDURE — 71046 X-RAY EXAM CHEST 2 VIEWS: CPT

## 2022-01-06 PROCEDURE — 81003 URINALYSIS AUTO W/O SCOPE: CPT

## 2022-01-06 PROCEDURE — 80053 COMPREHEN METABOLIC PANEL: CPT

## 2022-01-06 PROCEDURE — 85730 THROMBOPLASTIN TIME PARTIAL: CPT

## 2022-01-06 PROCEDURE — 36415 COLL VENOUS BLD VENIPUNCTURE: CPT

## 2022-01-06 PROCEDURE — 85610 PROTHROMBIN TIME: CPT

## 2022-01-06 PROCEDURE — 87389 HIV-1 AG W/HIV-1&-2 AB AG IA: CPT

## 2022-01-06 PROCEDURE — 80074 ACUTE HEPATITIS PANEL: CPT

## 2022-01-06 RX ORDER — LANOLIN ALCOHOL/MO/W.PET/CERES
1000 CREAM (GRAM) TOPICAL DAILY
COMMUNITY

## 2022-01-06 RX ORDER — NIACIN 500 MG
500 TABLET ORAL DAILY
COMMUNITY

## 2022-01-06 RX ORDER — MULTIVIT-MIN/IRON/FOLIC ACID/K 18-600-40
CAPSULE ORAL DAILY
COMMUNITY

## 2022-01-06 RX ORDER — VITAMIN B COMPLEX
1000 TABLET ORAL DAILY
COMMUNITY
End: 2022-01-06

## 2022-01-06 RX ORDER — GABAPENTIN 300 MG/1
600 CAPSULE ORAL 3 TIMES DAILY
COMMUNITY

## 2022-01-08 LAB
HCV RNA SERPL NAA+PROBE-ACNC: ABNORMAL IU/ML
HCV RNA SERPL NAA+PROBE-LOG IU: 4.05 LOG IU/ML
HCV RNA SERPL QL NAA+PROBE: DETECTED

## 2022-01-11 ENCOUNTER — OFFICE VISIT (OUTPATIENT)
Dept: URGENT CARE | Facility: CLINIC | Age: 62
End: 2022-01-11
Payer: OTHER GOVERNMENT

## 2022-01-11 VITALS
DIASTOLIC BLOOD PRESSURE: 108 MMHG | SYSTOLIC BLOOD PRESSURE: 160 MMHG | HEIGHT: 65 IN | HEART RATE: 71 BPM | OXYGEN SATURATION: 98 % | RESPIRATION RATE: 14 BRPM | BODY MASS INDEX: 27.66 KG/M2 | TEMPERATURE: 98.7 F | WEIGHT: 166 LBS

## 2022-01-11 DIAGNOSIS — U07.1 COVID: ICD-10-CM

## 2022-01-11 PROCEDURE — 99213 OFFICE O/P EST LOW 20 MIN: CPT | Mod: CS | Performed by: NURSE PRACTITIONER

## 2022-01-11 ASSESSMENT — FIBROSIS 4 INDEX: FIB4 SCORE: 1.88

## 2022-01-11 NOTE — LETTER
January 11, 2022         Patient: Gab Fountain   YOB: 1960   Date of Visit: 1/11/2022           To Whom it May Concern:    Gab Fountain was seen in my clinic on 1/11/2022. He may return back to work at this time wearing his mask , until 1/14/21. He has met the recommended requirement of isolation per CDC guidelines.  It is not recommended to retest for a negative test result.    If you have any questions or concerns, please don't hesitate to call.        Sincerely,           UYEN Curran  Electronically Signed

## 2022-01-14 ASSESSMENT — ENCOUNTER SYMPTOMS
CHILLS: 0
MYALGIAS: 0
FEVER: 0
EYE PAIN: 0
SORE THROAT: 0
VOMITING: 0
SHORTNESS OF BREATH: 0
DIZZINESS: 0
NAUSEA: 0

## 2022-01-14 NOTE — PROGRESS NOTES
Subjective:   Gab Fountain is a 61 y.o. male who presents for Coronavirus Screening (cough, body aches, runny nose. Cancelled surgery due to pos covid test 01/05. Pt needs to see if negative to return to work/surgery. )      HPI  Patient is a 61-year-old male who presents to the urgent care for request of a COVID testing in order to return back to work.  Patient states that he tested positive when he went into have his preop for surgery 1/5.  Patient having only slight congestion with a runny nose.  Patient is asymptomatic at this time, has no fever and/or chills, no nausea vomiting, diarrhea, no fever.  Patient has received 1 dose of the vaccination.  Patient has been in no close contact anybody positive for COVID.  Review of Systems   Constitutional: Negative for chills and fever.   HENT: Negative for sore throat.    Eyes: Negative for pain.   Respiratory: Negative for shortness of breath.    Cardiovascular: Negative for chest pain.   Gastrointestinal: Negative for nausea and vomiting.   Genitourinary: Negative for hematuria.   Musculoskeletal: Negative for myalgias.   Skin: Negative for rash.   Neurological: Negative for dizziness.       Medications:    • gabapentin Caps  • hydroCHLOROthiazide Tabs  • losartan Tabs  • Milk Thistle Caps  • niacin Tabs  • oxyCODONE-acetaminophen Tabs  • vitamin B-12 Tabs  • Vitamin C Caps    Allergies: Ace inhibitors    Problem List: Gab Fountain does not have any pertinent problems on file.    Surgical History:  Past Surgical History:   Procedure Laterality Date   • INCISION AND DRAINAGE ORTHOPEDIC  5/27/2011    Procedure: LUMBAR;  Surgeon: Chaim Barber M.D.;  Location: [facility];  Service:    • LUMBAR EXPLORATION  5/27/2011    Procedure: REMOVAL HARDWARE;  Surgeon: Chaim Barber M.D.;  Location: [facility];  Service:    • FUSION, SPINE, LUMBAR, PLIF  5/27/2011    Procedure: L4-5 W/ALLOGRAFT ;  Surgeon: Chaim Barber M.D.;  Location:  "SURGERY [facility];  Service:    • INCISION AND DRAINAGE GENERAL  5/19/2011    Performed by MARY ELLEN BARRAGAN at SURGERY [facility] ORS   • HARDWARE REMOVAL NEURO  5/19/2011    Performed by REGGIE TROY at SURGERY [facility] ORS   • OTHER      spinal fusion L1 S1 not sure  4/8/2010       Past Social Hx: Gab Fountain  reports that he has never smoked. He has never used smokeless tobacco. He reports current alcohol use. He reports that he does not use drugs.     Past Family Hx:  Gab Fountain family history includes COPD in his mother; Cancer in his father and maternal grandmother; Hypertension in his mother.     Problem list, medications, and allergies reviewed by myself today in Epic.     Objective:     /108   Pulse 71   Temp 37.1 °C (98.7 °F)   Resp 14   Ht 1.651 m (5' 5\")   Wt 75.3 kg (166 lb)   SpO2 98%   BMI 27.62 kg/m²     Physical Exam  Vitals and nursing note reviewed.   Constitutional:       General: He is not in acute distress.     Appearance: He is well-developed.   HENT:      Head: Normocephalic and atraumatic.      Right Ear: External ear normal.      Left Ear: External ear normal.      Nose: Nose normal.      Mouth/Throat:      Mouth: Mucous membranes are moist.   Eyes:      Conjunctiva/sclera: Conjunctivae normal.   Cardiovascular:      Rate and Rhythm: Normal rate.   Pulmonary:      Effort: Pulmonary effort is normal. No respiratory distress.      Breath sounds: Normal breath sounds.   Abdominal:      General: There is no distension.   Musculoskeletal:         General: Normal range of motion.   Skin:     General: Skin is warm and dry.   Neurological:      General: No focal deficit present.      Mental Status: He is alert and oriented to person, place, and time. Mental status is at baseline.      Gait: Gait (gait at baseline) normal.   Psychiatric:         Judgment: Judgment normal.         Assessment/Plan:     Diagnosis and associated orders:     1. COVID      "   Comments/MDM:     • Patient is a 61-year-old male who evidently tested positive for COVID 1/5.  Patient is asymptomatic at this time has had no fevers.  Discussed CDC guidelines with patient it is not recommended to retest for COVID-19 after testing positive.  Work note was provided discussing these guidelines  • Differential diagnosis, natural history, supportive care, and indications for immediate follow-up discussed.              My total time spent caring for the patient on the day of the encounter was 20 minutes.   This does not include time spent on separately billable procedures/tests.    Please note that this dictation was created using voice recognition software. I have made a reasonable attempt to correct obvious errors, but I expect that there are errors of grammar and possibly content that I did not discover before finalizing the note.    This note was electronically signed by Romero BOYD.

## 2022-03-01 DIAGNOSIS — I10 ESSENTIAL HYPERTENSION: ICD-10-CM

## 2022-03-01 RX ORDER — LOSARTAN POTASSIUM 100 MG/1
100 TABLET ORAL DAILY
Qty: 90 TABLET | Refills: 0 | Status: SHIPPED | OUTPATIENT
Start: 2022-03-01 | End: 2022-11-02

## 2022-09-29 ENCOUNTER — HOSPITAL ENCOUNTER (EMERGENCY)
Facility: MEDICAL CENTER | Age: 62
End: 2022-09-29
Attending: EMERGENCY MEDICINE
Payer: COMMERCIAL

## 2022-09-29 VITALS
HEIGHT: 65 IN | TEMPERATURE: 98.7 F | WEIGHT: 158.07 LBS | BODY MASS INDEX: 26.34 KG/M2 | DIASTOLIC BLOOD PRESSURE: 104 MMHG | RESPIRATION RATE: 18 BRPM | SYSTOLIC BLOOD PRESSURE: 178 MMHG | HEART RATE: 63 BPM | OXYGEN SATURATION: 95 %

## 2022-09-29 DIAGNOSIS — S39.012A LOW BACK STRAIN, INITIAL ENCOUNTER: ICD-10-CM

## 2022-09-29 LAB — EKG IMPRESSION: NORMAL

## 2022-09-29 PROCEDURE — 700111 HCHG RX REV CODE 636 W/ 250 OVERRIDE (IP): Performed by: EMERGENCY MEDICINE

## 2022-09-29 PROCEDURE — 93005 ELECTROCARDIOGRAM TRACING: CPT | Performed by: EMERGENCY MEDICINE

## 2022-09-29 PROCEDURE — 96374 THER/PROPH/DIAG INJ IV PUSH: CPT

## 2022-09-29 PROCEDURE — 93005 ELECTROCARDIOGRAM TRACING: CPT

## 2022-09-29 PROCEDURE — 99284 EMERGENCY DEPT VISIT MOD MDM: CPT

## 2022-09-29 PROCEDURE — 96375 TX/PRO/DX INJ NEW DRUG ADDON: CPT

## 2022-09-29 RX ORDER — METHYLPREDNISOLONE 4 MG/1
TABLET ORAL
Qty: 1 EACH | Refills: 0 | Status: SHIPPED | OUTPATIENT
Start: 2022-09-29

## 2022-09-29 RX ORDER — HYDROMORPHONE HYDROCHLORIDE 1 MG/ML
1 INJECTION, SOLUTION INTRAMUSCULAR; INTRAVENOUS; SUBCUTANEOUS ONCE
Status: COMPLETED | OUTPATIENT
Start: 2022-09-29 | End: 2022-09-29

## 2022-09-29 RX ORDER — HYDROCODONE BITARTRATE AND ACETAMINOPHEN 5; 325 MG/1; MG/1
1 TABLET ORAL EVERY 4 HOURS PRN
Qty: 15 TABLET | Refills: 0 | Status: SHIPPED | OUTPATIENT
Start: 2022-09-29 | End: 2022-10-02

## 2022-09-29 RX ORDER — KETOROLAC TROMETHAMINE 30 MG/ML
15 INJECTION, SOLUTION INTRAMUSCULAR; INTRAVENOUS ONCE
Status: COMPLETED | OUTPATIENT
Start: 2022-09-29 | End: 2022-09-29

## 2022-09-29 RX ORDER — DEXAMETHASONE SODIUM PHOSPHATE 4 MG/ML
8 INJECTION, SOLUTION INTRA-ARTICULAR; INTRALESIONAL; INTRAMUSCULAR; INTRAVENOUS; SOFT TISSUE ONCE
Status: COMPLETED | OUTPATIENT
Start: 2022-09-29 | End: 2022-09-29

## 2022-09-29 RX ADMIN — DEXAMETHASONE SODIUM PHOSPHATE 8 MG: 4 INJECTION, SOLUTION INTRA-ARTICULAR; INTRALESIONAL; INTRAMUSCULAR; INTRAVENOUS; SOFT TISSUE at 14:48

## 2022-09-29 RX ADMIN — HYDROMORPHONE HYDROCHLORIDE 1 MG: 1 INJECTION, SOLUTION INTRAMUSCULAR; INTRAVENOUS; SUBCUTANEOUS at 14:38

## 2022-09-29 RX ADMIN — KETOROLAC TROMETHAMINE 15 MG: 30 INJECTION, SOLUTION INTRAMUSCULAR at 14:48

## 2022-09-29 ASSESSMENT — FIBROSIS 4 INDEX: FIB4 SCORE: 1.88

## 2022-09-29 NOTE — LETTER
[facility], EMERGENCY DEPT   [address], [place] [address]  Phone: Dept: [phone] - Fax:        [facility] Progress Report and Disability Certification  Date of Service: 9/29/2022   No Show:  No  Date / Time of Next Visit:     Claim Information   Patient Name: Gab Fountain  Claim Number:     Employer: [employer]  Date of Injury:      Insurer / TPA:   ID / SSN: [id number]    Occupation:  Diagnosis: There were no encounter diagnoses.    Medical Information   Related to Industrial Injury?   ***   Subjective Complaints:      Objective Findings:     Pre-Existing Condition(s):     Assessment:        Status:    Permanent Disability:     Plan:      Diagnostics:      Comments:       Disability Information   Status:      From:     Through:   Restrictions are:     Physical Restrictions   Sitting:    Standing:    Stooping:    Bending:      Squatting:    Walking:    Climbing:    Pushing:      Pulling:    Other:    Reaching Above Shoulder (L):   Reaching Above Shoulder (R):       Reaching Below Shoulder (L):    Reaching Below Shoulder (R):      Not to exceed Weight Limits   Carrying(hrs):   Weight Limit(lb):   Lifting(hrs):   Weight  Limit(lb):     Comments:      Repetitive Actions   Hands: i.e. Fine Manipulations from Grasping:     Feet: i.e. Operating Foot Controls:     Driving / Operate Machinery:     Physician Name: Tariq Arana Physician Signature:  e-Signature:  , Medical Director   Clinic Name / Location: [facility], EMERGENCY DEPT  [address] [facility] [id number] [phone]     Clinic Phone Number: Dept: [phone]   Appointment Time:  Visit Start Time:    Check-In Time:  11:23 AM Visit Discharge Time:    Original-Treating Physician or Chiropractor    Page 2-Insurer/TPA    Page 3-Employer    Page 4-Employee

## 2022-09-29 NOTE — LETTER
[facility], EMERGENCY DEPT   [address] [place], [place] [address]  Phone: Dept: [phone] - Fax:        [facility] Progress Report and Disability Certification  Date of Service: 9/29/2022   No Show:  No  Date / Time of Next Visit:     Claim Information   Patient Name: Gab Fountain  Claim Number:     Employer: [employer]  Date of Injury:      Insurer / TPA:   ID / SSN: [id number]    Occupation:  Diagnosis: There were no encounter diagnoses.    Medical Information   Related to Industrial Injury?   ***   Subjective Complaints:      Objective Findings:     Pre-Existing Condition(s):     Assessment:        Status:    Permanent Disability:     Plan:      Diagnostics:      Comments:       Disability Information   Status:      From:     Through:   Restrictions are:     Physical Restrictions   Sitting:    Standing:    Stooping:    Bending:      Squatting:    Walking:    Climbing:    Pushing:      Pulling:    Other:    Reaching Above Shoulder (L):   Reaching Above Shoulder (R):       Reaching Below Shoulder (L):    Reaching Below Shoulder (R):      Not to exceed Weight Limits   Carrying(hrs):   Weight Limit(lb):   Lifting(hrs):   Weight  Limit(lb):     Comments:      Repetitive Actions   Hands: i.e. Fine Manipulations from Grasping:     Feet: i.e. Operating Foot Controls:     Driving / Operate Machinery:     Physician Name: Tariq Arana Physician Signature: WAYLON Renee  e-Signature:  , Medical Director   Clinic Name / Location: [facility], EMERGENCY DEPT  [address] [place] [id number] [phone]     Clinic Phone Number: Dept: [phone]   Appointment Time:  Visit Start Time:    Check-In Time:  11:23 AM Visit Discharge Time:    Original-Treating Physician or Chiropractor    Page 2-Insurer/TPA    Page 3-Employer    Page 4-Employee

## 2022-09-29 NOTE — LETTER
[facility], EMERGENCY DEPT   [address], [place] [address]  Phone: Dept: [phone] - Fax:        [facility] Progress Report and Disability Certification  Date of Service: 9/29/2022   No Show:  No  Date / Time of Next Visit:     Claim Information   Patient Name: Gab Fountain  Claim Number:     Employer: [employer]  Date of Injury:      Insurer / TPA:   ID / SSN: [id number]    Occupation:  Diagnosis: There were no encounter diagnoses.    Medical Information   Related to Industrial Injury?   ***   Subjective Complaints:      Objective Findings:     Pre-Existing Condition(s):     Assessment:        Status:    Permanent Disability:     Plan:      Diagnostics:      Comments:       Disability Information   Status:      From:     Through:   Restrictions are:     Physical Restrictions   Sitting:    Standing:    Stooping:    Bending:      Squatting:    Walking:    Climbing:    Pushing:      Pulling:    Other:    Reaching Above Shoulder (L):   Reaching Above Shoulder (R):       Reaching Below Shoulder (L):    Reaching Below Shoulder (R):      Not to exceed Weight Limits   Carrying(hrs):   Weight Limit(lb):   Lifting(hrs):   Weight  Limit(lb):     Comments:      Repetitive Actions   Hands: i.e. Fine Manipulations from Grasping:     Feet: i.e. Operating Foot Controls:     Driving / Operate Machinery:     Physician Name: Tariq Arana Physician Signature: WAYLON Renee  e-Signature:  , Medical Director   Clinic Name / Location: [facility], EMERGENCY DEPT  [address] [place] [id number] [phone]     Clinic Phone Number: Dept: [phone]   Appointment Time:  Visit Start Time:    Check-In Time:  11:23 AM Visit Discharge Time:    Original-Treating Physician or Chiropractor    Page 2-Insurer/TPA    Page 3-Employer    Page 4-Employee

## 2022-09-29 NOTE — DISCHARGE INSTRUCTIONS
No heavy lifting or bending for 3 days.  Return to [facility] emergency department for fever, leg weakness (inability to walk or bear weight), loss of bowel or bladder control, loss of feeling between your legs or any new symptoms.      Back Pain (Lumbosacral Strain)  Back pain is one of the most common causes of pain. There are many causes of back pain. Most are not serious conditions.    CAUSES  Your backbone (spinal column) is made up of 24 main vertebral bodies, the sacrum, and the coccyx. These are held together by muscles and tough, fibrous tissue (ligaments). Nerve roots pass through the openings between the vertebrae. A sudden move or injury to the back may cause injury to, or pressure on, these nerves. This may result in localized back pain or pain movement (radiation) into the buttocks, down the leg, and into the foot. Sharp, shooting pain from the buttock down the back of the leg (sciatica) is frequently associated with a ruptured (herniated) disc. Pain may be caused by muscle spasm alone.  Your caregiver can often find the cause of your pain by the details of your symptoms and an exam. In some cases, you may need tests (such as X-rays). Your caregiver will work with you to decide if any tests are needed based on your specific exam.  HOME CARE INSTRUCTIONS  Avoid an underactive lifestyle. Active exercise, as directed by your caregiver, is your greatest weapon against back pain.   Avoid hard physical activities (tennis, racquetball, water-skiing) if you are not in proper physical condition for it. This may aggravate and/or create problems.   If you have a back problem, avoid sports requiring sudden body movements. Swimming and walking are generally safer activities.   Maintain good posture.   Avoid becoming overweight (obese).   Use bed rest for only the most extreme, sudden (acute) episode. Your caregiver will help you determine how much bed rest is necessary.   For acute conditions,  you may put ice on the injured area.   Put ice in a plastic bag.   Place a towel between your skin and the bag.   Leave the ice on for 30 minutes at a time, every 2 hours, or as needed.   After you are improved and more active, it may help to apply heat for 30 minutes before activities.   See your caregiver if you are having pain that lasts longer than expected. Your caregiver can advise appropriate exercises and/or therapy if needed. With conditioning, most back problems can be avoided.  SEEK IMMEDIATE MEDICAL CARE IF:  You have numbness, tingling, weakness, or problems with the use of your arms or legs.   You experience severe back pain not relieved with medicines.   There is a change in bowel or bladder control.   You have increasing pain in any area of the body, including your belly (abdomen).   You notice shortness of breath, dizziness, or feel faint.   You feel sick to your stomach (nauseous), are throwing up (vomiting), or become sweaty.   You notice discoloration of your toes or legs, or your feet get very cold.   Your back pain is getting worse.   You have an oral temperature above 102° F (38.9° C), not controlled by medicine.   MAKE SURE YOU:   Understand these instructions.   Will watch your condition.   Will get help right away if you are not doing well or get worse.   Document Released: 03/14/2011   [employer]® Patient Information ©2011 [employer].  Back Exercises  Back exercises help treat and prevent back injuries. The goal of back exercises is to increase the strength of your abdominal and back muscles and the flexibility of your back. These exercises should be started when you no longer have back pain. Back exercises include:  Pelvic Tilt - Lie on your back with your knees bent. Tilt your pelvis until the lower part of your back is against the floor. Hold this position 5-10 sec and repeat 5-10 times.   Knee to Chest - Pull first one knee up against your chest and hold for 20-30 seconds, repeat this  with the other knee, and then both knees. This may be done with the other leg straight or bent, whichever feels better.   Sit-Ups or Curl-Ups - Bend your knees 90 degrees. Start with tilting your pelvis, and do a partial, slow sit-up, lifting your trunk only 30-45 degrees off the floor. Take at least 2-3 sec for each sit-up. Do not do sit-ups with your knees out straight. If partial sit-ups are difficult, simply do the above but with only tightening your abdominal muscles and holding it as directed.   Hip-Lift - Lie on your back with your knees flexed 90 degrees. Push down with your feet and shoulders as you raise your hips a couple inches off the floor; hold for 10 sec, repeat 5-10 times.   Back arches - Lie on your stomach, propping yourself up on bent elbows. Slowly press on your hands, causing an arch in your low back. Repeat 3-5 times. Any initial stiffness and discomfort should lessen with repetition over time.   Shoulder-Lifts - Lie face down with arms beside your body. Keep hips and torso pressed to floor as you slowly lift your head and shoulders off the floor.   Do not overdo your exercises, especially in the beginning. Exercises may cause you some mild back discomfort which lasts for a few minutes; however, if the pain is more severe, or lasts for more than 15 minutes, do not continue exercises until you see your caregiver. Improvement with exercise therapy for back problems is slow.   See your caregivers for assistance with developing a proper back exercise program.  Document Released: 01/25/2006 Document Re-Released: 03/16/2010  [employer]® Patient Information ©2011 [employer], "[employer].    Follow up with your primary care physician for re-evaluation of your blood pressure.

## 2022-09-29 NOTE — LETTER
[facility], EMERGENCY DEPT   [address] [place], [place] [address]  Phone: Dept: [phone] - Fax:        [facility] Progress Report and Disability Certification  Date of Service: 9/29/2022   No Show:  No  Date / Time of Next Visit:     Claim Information   Patient Name: Gab Fountain  Claim Number:     Employer: [employer]  Date of Injury: 2/11/2021     Insurer / TPA:  Johnnie Littlejohn ID / SSN:    Occupation:  Diagnosis: The encounter diagnosis was Low back strain, initial encounter.    Medical Information   Related to Industrial Injury?      Subjective Complaints:  Chronic back pain.  The patient states that his back pain has been increasing in severity last 2 weeks.  Pain radiates down his left leg into the medial aspect of his left leg as well.  Denies saddle anesthesia urinary bowel incontinence or retention.   Objective Findings: No neurological deficits, no saddle anesthesia, strength is 5/5 lower extremities bilaterally   Pre-Existing Condition(s):     Assessment:   Initial Visit    Status: Additional Care Required  Comments:Will need pain management  Permanent Disability:     Plan: Medication  Comments:Norco, Medrol Dosepak    Diagnostics:      Comments:  Patient to continue the restrictions placed already by his spine surgeon and follow-up with pain management.    Disability Information   Status:      From:     Through:   Restrictions are:     Physical Restrictions   Sitting:    Standing:    Stooping:    Bending:      Squatting:    Walking:    Climbing:    Pushing:      Pulling:    Other:    Reaching Above Shoulder (L):   Reaching Above Shoulder (R):       Reaching Below Shoulder (L):    Reaching Below Shoulder (R):      Not to exceed Weight Limits   Carrying(hrs):   Weight Limit(lb):   Lifting(hrs):   Weight  Limit(lb):     Comments:      Repetitive Actions   Hands: i.e. Fine Manipulations from Grasping:     Feet: i.e.  Operating Foot Controls:     Driving / Operate Machinery:     Physician Name: Bhakti Arana Physician Signature: [email or web address]-BHAKTI Ziegler D.O. e-Signature:  , Medical Director   Clinic Name / Location: [facility], EMERGENCY DEPT  [address] [place] [address]  [phone] Clinic Phone Number: Dept: [phone]   Appointment Time:  Visit Start Time:    Check-In Time:  11:23 AM Visit Discharge Time:    Original-Treating Physician or Chiropractor    Page 2-Insurer/TPA    Page 3-Employer    Page 4-Employee

## 2022-09-29 NOTE — ED NOTES
Pt ambulatory to ORA 02. Pt placed on monitor.  Agree with triage note.   Chart up and ready for ERP now.

## 2022-09-29 NOTE — ED PROVIDER NOTES
ED Provider Note    CHIEF COMPLAINT  Chief Complaint   Patient presents with    Low Back Pain     Pt states he has severe lower back pain which radiates down his right leg for 1x week. Pt was seen by Dr. Post, a spinal surgeon, today in clinic. Hx of spinal dissection surgery       HPI  Gab Fountain is a 61 y.o. male who presents to the emergency department with complaint low back pain.  The patient states he was injured in a industrial accident several years ago and has had a fusion of his lower back.  He recently had a discectomy by Dr. Post last February and has been following with him.  His pain has been increasing severity especially left lower lumbar region going to his buttocks into his medial thigh.  The patient was seen at Dr. Lama's office and was instructed to come here to the emergency department for further care for his pain.  They are trying to set up an outpatient pain management regimen.  The patient's had steroids before, gabapentin, muscle relaxants, narcotics, multiple different types of medications with intermittent relief.  He denies loss of sensation or strength to his lower extremities, saddle anesthesia, urinary or bowel incontinence or retention, fever.    REVIEW OF SYSTEMS  Positives as above. Pertinent negatives include saddle anesthesia, urinary or bowel incontinence or retention  All other 10 review of systems are negative    PAST MEDICAL HISTORY  Past Medical History:   Diagnosis Date    Hypertension     Infectious disease 2010    MRSA       FAMILY HISTORY  Noncontributory    SOCIAL HISTORY  Social History     Socioeconomic History    Marital status:    Tobacco Use    Smoking status: Never    Smokeless tobacco: Never   Vaping Use    Vaping Use: Never used   Substance and Sexual Activity    Alcohol use: Yes     Comment: rare    Drug use: No    Sexual activity: Yes     Partners: Female       SURGICAL HISTORY  Past Surgical History:   Procedure Laterality Date     "INCISION AND DRAINAGE ORTHOPEDIC  5/27/2011    Procedure: LUMBAR;  Surgeon: Reggie Troy M.D.;  Location: SURGERY [facility];  Service:     LUMBAR EXPLORATION  5/27/2011    Procedure: REMOVAL HARDWARE;  Surgeon: Reggie Troy M.D.;  Location: SURGERY [facility];  Service:     FUSION, SPINE, LUMBAR, PLIF  5/27/2011    Procedure: L4-5 W/ALLOGRAFT ;  Surgeon: Reggie Troy M.D.;  Location: SURGERY [facility];  Service:     INCISION AND DRAINAGE GENERAL  5/19/2011    Performed by MARY ELLEN BARRAGAN at SURGERY [facility]    HARDWARE REMOVAL NEURO  5/19/2011    Performed by REGGIE TROY at SURGERY [facility]    OTHER      spinal fusion L1 S1 not sure  4/8/2010       CURRENT MEDICATIONS  Home Medications    **Home medications have not yet been reviewed for this encounter**         ALLERGIES  Allergies   Allergen Reactions    Ace Inhibitors      tinnitus       PHYSICAL EXAM  VITAL SIGNS: BP (!) 178/104   Pulse 63   Temp 37.1 °C (98.7 °F) (Temporal)   Resp 18   Ht 1.651 m (5' 5\")   Wt 71.7 kg (158 lb 1.1 oz)   SpO2 95%   BMI 26.30 kg/m²      Constitutional: Well developed, Well nourished, slight acute distress, Non-toxic appearance.   Eyes: PERRLA, EOMI, Conjunctiva normal, No discharge.   Cardiovascular: Normal heart rate, Normal rhythm, No murmurs, No rubs, No gallops, and intact distal pulses.   Thorax & Lungs:  No respiratory distress, no rales, no rhonchi, No wheezing, No chest wall tenderness.   Abdomen: Bowel sounds normal, Soft, No tenderness, No guarding, No rebound, No pulsatile masses.  Back: Slight paravertebral muscle spasm lumbar spine, surgical scar in the lumbar region as well  Skin: Warm, Dry, No erythema, No rash.   Extremities: Full range of motion, no deformity, no edema.  Neurologic: Alert & oriented x 3, no saddle anesthesia, leg lift is 5/5 bilaterally, plantar flexion dorsiflexion/5 bilaterally, DTRs are 2/4 lower extremes bilaterally, normal " ambulation  Psychiatric: Affect normal for clinical presentation.        COURSE & MEDICAL DECISION MAKING  Pertinent Labs & Imaging studies reviewed. (See chart for details)  This is a pleasant 61-year-old gentleman presents with acute exacerbation of his chronic low back pain.  Here in the emergency department, the patient has no evidence of cauda equina syndrome, no red flags for epidural abscess, epidural hematoma.  He received Dilaudid IM, Decadron orally and Toradol IM.  On reevaluation, the patient is feeling significant better Bentyl believe he has complete resolution of symptoms.  Unable to completely resolve his symptoms at this point time.  The patient will be following with pain management as an outpatient.  I offered him a small dose of pain medication form of Norco as well as Medrol Dosepak and strict return precautions have been given.    In prescribing controlled substances to this patient, I certify that I have obtained and reviewed the medical history of Gab Fountain. I have also made a good aline effort to obtain applicable records from other providers who have treated the patient and records did not demonstrate any increased risk of substance abuse that would prevent me from prescribing controlled substances.     I have conducted a physical exam and documented it. I have reviewed Mr. Fountain’s prescription history as maintained by the [place] Prescription Monitoring Program.     I have assessed the patient’s risk for abuse, dependency, and addiction using the validated Opioid Risk Tool available at https://www.mdcalc.com/rqdoii-bdis-cvoe-ort-narcotic-abuse.     Given the above, I believe the benefits of controlled substance therapy outweigh the risks. The reasons for prescribing controlled substances include non-narcotic, oral analgesic alternatives have been inadequate for pain control. Accordingly, I have discussed the risk and benefits, treatment plan, and alternative therapies with the  patient.       FINAL IMPRESSION     1. Low back strain, initial encounter Active       DISPOSITION:  Patient will be discharged home in stable condition.    FOLLOW UP:  [facility], Emergency Dept  [address]  [place] [address] [phone]    If symptoms worsen    ZAHRA Vásquez  [address] [place] [id number] [phone]    Schedule an appointment as soon as possible for a visit         OUTPATIENT MEDICATIONS:  Discharge Medication List as of 9/29/2022  4:00 PM        START taking these medications    Details   methylPREDNISolone (MEDROL DOSEPAK) 4 MG Tablet Therapy Pack Use as directed, Disp-1 Each, R-0, Normal      HYDROcodone-acetaminophen (NORCO) 5-325 MG Tab per tablet Take 1 Tablet by mouth every four hours as needed (pain) for up to 3 days., Disp-15 Tablet, R-0, Normal               Electronically signed by: Tariq Arana D.O., 9/29/2022 1:30 PM

## 2022-09-29 NOTE — ED NOTES
Pt discharged home. Explained discharge and medication instructions. Questions and comments addressed. Pt instructed no driving while taking narcotic medications. Pt advised of risks/benefits of opioid medications. Pt verbalized understanding, consent signed. Pt advised to follow-up with PCP and pain management or return to ED for any new or worsening of symptoms. Pt is ambulating well and steady on feet. VS stable. Pt ambulatory to ED lobby now, pt being picked up by daughter for transport home.

## 2022-09-29 NOTE — LETTER
[facility], EMERGENCY DEPT   [address], [place] [address]  Phone: Dept: [phone] - Fax:        [facility] Progress Report and Disability Certification  Date of Service: 9/29/2022   No Show:  No  Date / Time of Next Visit:     Claim Information   Patient Name: Gab Fountain  Claim Number:     Employer: [employer]  Date of Injury:      Insurer / TPA:   ID / SSN: [id number]    Occupation:  Diagnosis: There were no encounter diagnoses.    Medical Information   Related to Industrial Injury?   ***   Subjective Complaints:      Objective Findings:     Pre-Existing Condition(s):     Assessment:        Status:    Permanent Disability:     Plan:      Diagnostics:      Comments:       Disability Information   Status:      From:     Through:   Restrictions are:     Physical Restrictions   Sitting:    Standing:    Stooping:    Bending:      Squatting:    Walking:    Climbing:    Pushing:      Pulling:    Other:    Reaching Above Shoulder (L):   Reaching Above Shoulder (R):       Reaching Below Shoulder (L):    Reaching Below Shoulder (R):      Not to exceed Weight Limits   Carrying(hrs):   Weight Limit(lb):   Lifting(hrs):   Weight  Limit(lb):     Comments:      Repetitive Actions   Hands: i.e. Fine Manipulations from Grasping:     Feet: i.e. Operating Foot Controls:     Driving / Operate Machinery:     Physician Name: Tariq Arana Physician Signature:   e-Signature:  , Medical Director   Clinic Name / Location: [facility], EMERGENCY DEPT  [address] [id number] [phone]     Clinic Phone Number: Dept: [phone]   Appointment Time:  Visit Start Time:    Check-In Time:  11:23 AM Visit Discharge Time:    Original-Treating Physician or Chiropractor    Page 2-Insurer/TPA    Page 3-Employer    Page 4-Employee

## 2022-09-29 NOTE — ED TRIAGE NOTES
Gab Fountain  61 y.o. male  Chief Complaint   Patient presents with    Low Back Pain     Pt states he has severe lower back pain which radiates down his right leg for 1x week. Pt was seen by Dr. Post, a spinal surgeon, today in clinic. Hx of spinal dissection surgery       Vitals:    09/29/22 1126   BP: (!) 227/123   Pulse: 71   Resp: 18   Temp: 36.8 °C (98.2 °F)   SpO2: 97%       Patient educated on triage process and encouraged to alert staff of any changes in condition.    Pt ambulatory to triage for the above complaint. Pt states he has a hx of htn but has not taken his medication for at least 2x days.

## 2022-11-01 DIAGNOSIS — I10 ESSENTIAL HYPERTENSION: ICD-10-CM

## 2022-11-02 RX ORDER — LOSARTAN POTASSIUM 100 MG/1
100 TABLET ORAL DAILY
Qty: 90 TABLET | Refills: 0 | Status: SHIPPED | OUTPATIENT
Start: 2022-11-02

## 2023-01-24 ENCOUNTER — HOSPITAL ENCOUNTER (EMERGENCY)
Facility: MEDICAL CENTER | Age: 63
End: 2023-01-24
Attending: EMERGENCY MEDICINE
Payer: COMMERCIAL

## 2023-01-24 VITALS
TEMPERATURE: 97.2 F | DIASTOLIC BLOOD PRESSURE: 134 MMHG | BODY MASS INDEX: 27.03 KG/M2 | OXYGEN SATURATION: 93 % | WEIGHT: 162.26 LBS | RESPIRATION RATE: 16 BRPM | HEART RATE: 82 BPM | HEIGHT: 65 IN | SYSTOLIC BLOOD PRESSURE: 209 MMHG

## 2023-01-24 DIAGNOSIS — G89.29 CHRONIC MIDLINE LOW BACK PAIN WITHOUT SCIATICA: ICD-10-CM

## 2023-01-24 DIAGNOSIS — M54.50 CHRONIC MIDLINE LOW BACK PAIN WITHOUT SCIATICA: ICD-10-CM

## 2023-01-24 DIAGNOSIS — I10 PRIMARY HYPERTENSION: ICD-10-CM

## 2023-01-24 DIAGNOSIS — M62.838 MUSCLE SPASM: ICD-10-CM

## 2023-01-24 PROCEDURE — 96372 THER/PROPH/DIAG INJ SC/IM: CPT

## 2023-01-24 PROCEDURE — 99284 EMERGENCY DEPT VISIT MOD MDM: CPT

## 2023-01-24 PROCEDURE — 700111 HCHG RX REV CODE 636 W/ 250 OVERRIDE (IP): Performed by: STUDENT IN AN ORGANIZED HEALTH CARE EDUCATION/TRAINING PROGRAM

## 2023-01-24 RX ORDER — HYDROMORPHONE HYDROCHLORIDE 1 MG/ML
1 INJECTION, SOLUTION INTRAMUSCULAR; INTRAVENOUS; SUBCUTANEOUS ONCE
Status: COMPLETED | OUTPATIENT
Start: 2023-01-24 | End: 2023-01-24

## 2023-01-24 RX ORDER — OXYCODONE HYDROCHLORIDE AND ACETAMINOPHEN 5; 325 MG/1; MG/1
1 TABLET ORAL EVERY 8 HOURS PRN
Qty: 15 TABLET | Refills: 0 | Status: SHIPPED | OUTPATIENT
Start: 2023-01-24 | End: 2023-01-29

## 2023-01-24 RX ORDER — HYDROMORPHONE HYDROCHLORIDE 1 MG/ML
0.5 INJECTION, SOLUTION INTRAMUSCULAR; INTRAVENOUS; SUBCUTANEOUS ONCE
Status: COMPLETED | OUTPATIENT
Start: 2023-01-24 | End: 2023-01-24

## 2023-01-24 RX ORDER — KETOROLAC TROMETHAMINE 30 MG/ML
15 INJECTION, SOLUTION INTRAMUSCULAR; INTRAVENOUS ONCE
Status: DISCONTINUED | OUTPATIENT
Start: 2023-01-24 | End: 2023-01-24 | Stop reason: HOSPADM

## 2023-01-24 RX ORDER — HYDROMORPHONE HYDROCHLORIDE 1 MG/ML
0.5 INJECTION, SOLUTION INTRAMUSCULAR; INTRAVENOUS; SUBCUTANEOUS ONCE
Status: DISCONTINUED | OUTPATIENT
Start: 2023-01-24 | End: 2023-01-24

## 2023-01-24 RX ORDER — AMLODIPINE BESYLATE 5 MG/1
5 TABLET ORAL DAILY
Qty: 30 TABLET | Refills: 0 | Status: SHIPPED | OUTPATIENT
Start: 2023-01-24

## 2023-01-24 RX ADMIN — HYDROMORPHONE HYDROCHLORIDE 1 MG: 1 INJECTION, SOLUTION INTRAMUSCULAR; INTRAVENOUS; SUBCUTANEOUS at 09:04

## 2023-01-24 RX ADMIN — HYDROMORPHONE HYDROCHLORIDE 0.5 MG: 1 INJECTION, SOLUTION INTRAMUSCULAR; INTRAVENOUS; SUBCUTANEOUS at 10:24

## 2023-01-24 ASSESSMENT — FIBROSIS 4 INDEX: FIB4 SCORE: 1.91

## 2023-01-24 NOTE — ED NOTES
Patient reports toradol did not help him the last time he was here for back pain. Patient ambulatory to the restroom with his walking stick.

## 2023-01-24 NOTE — ED PROVIDER NOTES
"  ER Provider Note    Scribed for Boom Jacobs M.D. by Zuleyka Burnett. 1/24/2023  8:09 AM    Primary Care Provider: Patient states none.     CHIEF COMPLAINT  Chief Complaint   Patient presents with    Low Back Pain     Patient reports chronic low back pain. Patient unable to see pain management doctor until the end of February    Hypertension     Patient reports hx of HTN, takes losartan, states BP is \"exacerbated by back pain\"     EXTERNAL RECORDS REVIEWED  Outpatient Notes : Reviewed past records about primary care follow up. Patient has not seen a primary care physician in awhile.    HPI/ROS  LIMITATION TO HISTORY   None    OUTSIDE HISTORIAN(S):  None    Gab Fountain is a 62 y.o. male with a history of hypertension who presents to the ED complaining of chronic low back pain. The patient explains that he has had a total of five back surgeries and has chronic low back pain after falling off a ladder at work. Patient states that he was seen by Dr. Baptiste (Neurosurgery) recently, and is scheduled to see Dr. Osman with pain management in the end of February. He was given a prescription of Vicodin last time he was at the ED, but states that he needs something stronger before his pain management appointment. He took Gabapentin last night with no relief. He also reports of spasms to both his legs, more on the left. He has a history of hypertension and medicates with Losartan every morning. He did take his medications this morning but still reports elevated blood pressure that may be due to his pain. Currently his blood pressure is 204/125. He does not experience any urinary retention, urinary incontinence, fecal incontinence, constipation, headache, vision changes, or numbness inside the legs. He is not established with a PCP.     PAST MEDICAL HISTORY  Past Medical History:   Diagnosis Date    Hypertension     Infectious disease 2010    MRSA       SURGICAL HISTORY  Past Surgical History:   Procedure " Laterality Date    INCISION AND DRAINAGE ORTHOPEDIC  5/27/2011    Procedure: LUMBAR;  Surgeon: Reggie Troy M.D.;  Location: SURGERY [facility];  Service:     LUMBAR EXPLORATION  5/27/2011    Procedure: REMOVAL HARDWARE;  Surgeon: Reggie Troy M.D.;  Location: SURGERY [facility];  Service:     FUSION, SPINE, LUMBAR, PLIF  5/27/2011    Procedure: L4-5 W/ALLOGRAFT ;  Surgeon: Reggie Troy M.D.;  Location: SURGERY [facility];  Service:     INCISION AND DRAINAGE GENERAL  5/19/2011    Performed by MARY ELLEN BARRAGAN at SURGERY [facility]    HARDWARE REMOVAL NEURO  5/19/2011    Performed by REGGIE TROY at SURGERY [facility]    OTHER      spinal fusion L1 S1 not sure  4/8/2010       FAMILY HISTORY  Family History   Problem Relation Age of Onset    COPD Mother     Hypertension Mother     Cancer Father         non-small cell carcinoma; prostate    Cancer Maternal Grandmother         breast    Diabetes Neg Hx     Heart Disease Neg Hx     Hyperlipidemia Neg Hx     Stroke Neg Hx        SOCIAL HISTORY   reports that he has never smoked. He has never used smokeless tobacco. He reports current alcohol use. He reports that he does not use drugs.    CURRENT MEDICATIONS  Discharge Medication List as of 1/24/2023 11:21 AM        CONTINUE these medications which have NOT CHANGED    Details   losartan (COZAAR) 100 MG Tab TAKE 1 TABLET BY MOUTH EVERY DAY, Disp-90 Tablet, R-0, Normal      methylPREDNISolone (MEDROL DOSEPAK) 4 MG Tablet Therapy Pack Use as directed, Disp-1 Each, R-0, Normal      Cyanocobalamin (VITAMIN B-12) 1000 MCG Tab Take 1,000 mcg by mouth every day., Historical Med      Milk Thistle 1000 MG Cap Take  by mouth every day., Historical Med      Ascorbic Acid (VITAMIN C) 500 MG Cap Take  by mouth every day., Historical Med      niacin 500 MG Tab Take 500 mg by mouth every day., Historical Med      gabapentin (NEURONTIN) 300 MG Cap Take 600 mg by mouth 3 times a day., Historical Med  "     hydroCHLOROthiazide (HYDRODIURIL) 25 MG Tab TAKE 1 TABLET BY MOUTH EVERY DAY, Disp-90 Tablet, R-0, Normal           ALLERGIES  Ace inhibitors    PHYSICAL EXAM  BP (!) 204/125   Pulse 93   Temp 35.9 °C (96.6 °F) (Temporal)   Resp 18   Ht 1.651 m (5' 5\")   Wt 73.6 kg (162 lb 4.1 oz)   SpO2 95%   BMI 27.00 kg/m²     Constitutional: Well developed, Well nourished, No acute distress, Non-toxic appearance.   HENT: Normocephalic, Atraumatic, Bilateral external ears normal, Nose normal.   Eyes:conjunctiva is normal, there are no signs of exudate.   Neck: Supple, no meningeal signs.  Lymphatic: No lymphadenopathy noted.   Cardiovascular: Regular rate and rhythm without murmurs gallops or rubs.   Thorax & Lungs: No respiratory distress. Breathing comfortably. Lungs are clear to auscultation bilaterally, there are no wheezes no rales.   Abdomen: Soft, nontender, nondistended.   Skin: Warm, Dry, No erythema.   Back: paraspinal tenderness lower back, No CVA tenderness.  Musculoskeletal: Good range of motion in all major joints. No tenderness to palpation or major deformities noted. Intact distal pulses, no clubbing, no cyanosis, no edema.  Neurologic: Alert & oriented x 3, Moving all extremities. No gross abnormalities.  Has normal distal sensation.  DTRs are 0-1+ and equal throughout.  Psychiatric: Affect normal, Judgment normal, Mood normal.    COURSE & MEDICAL DECISION MAKING     ED Observation Status? Yes; I am placing the patient in to an observation status due to a diagnostic uncertainty as well as therapeutic intensity. Patient placed in observation status at 8:31 AM, 1/24/2023.     Observation plan is as follows: Monitor his blood pressure and pain after therapy.    Upon Reevaluation, the patient's condition has: Improved; and will be discharged.    Patient discharged from ED Observation status at 10:05 AM (Time) 1/24/2023 (Date).     INITIAL ASSESSMENT AND PLAN  Care Narrative: Patient presents for " evaluation with an acute exacerbation of his chronic lower back pain as well as a history of hypertension.  The patient is on antihypertensive medications.  Initially just wanted pain control.  Clinically he has good range of motion no signs of Sandrita Aquinas syndrome so imaging studies not necessary at this time.  He just requested pain medications.  Initially started the patient with a dose of Dilaudid 1 mg IM and Toradol however he refused to have the Toradol.  The patient then continued to say that he needed more pain medications and a long discussion was given to the patient about pain control and special of chronic exacerbations.  He does have a primary care physician.  He has not seen a primary care physician for either his hypertension or this particular problem this apparently is a Workmen's Compensation injury.  The patient is awaiting to see pain management at the end of next month.  At this point I am willing to give the patient a prescription of 5 mg Percocet tablets.  The patient then argued that he needed 10 mg.  At this point I will I am willing to give his 5 mg Percocet doses I will give him an additional dose of 0.5 mg of Dilaudid.  At this point I recommended that he follow-up with his primary care physician as well to follow for bridging for his pain control as well as his hypertension I will start the patient on amlodipine as additional medication for his blood pressure and he is to follow-up as above.    8:31 AM - Patient seen and examined at bedside by resident physician. Discussed plan of care, including pain medications and imaging. Patient agrees to the plan of care. The patient will be  medicated with Toradol 15 mg injection and Dilaudid 1 mg injection.     10:05 AM - Patient was reevaluated at bedside. The patient states that he feels mildly improved after Dilaudid, but refused the Toradol because he reports that it did not help with his pain last time. He was able to ambulate to the  bathroom with a steady gait. He will be treated with Dilaudid 0.5 mg injection. Discussed additional blood pressure medication on discharge and patient is agreeable to this plan. Patient had the opportunity to ask any questions. The plan for discharge was discussed with them and he was told to return for any new or worsening symptoms. He was also informed of the plans to assist him in establishing a PCP. Patient is understanding and agreeable to the plan for discharge.     ADDITIONAL PROBLEM LIST AND DISPOSITION  1.  Hypertension commended follow with primary care physician for recheck and continue therapy we will add an additional medication here patient states he does not of her primary care physician so we will have case management help the patient establish 1 here and get an appointment.  2.  Chronic back pain with acute exacerbation.  Recommend physical therapy follow-up with pain specialist for further outpatient treatment and care.        Discussion of management with other QHP or appropriate source(s): Case Management        Escalation of care considered, and ultimately not performed: diagnostic imaging.    Barriers to care at this time, including but not limited to:  Adequate follow up .         The patient will return for new or worsening symptoms and is stable at the time of discharge.    The patient is referred to a primary physician for blood pressure management, diabetic screening, and for all other preventative health concerns.    I reviewed prescription monitoring program for patient's narcotic use before prescribing a scheduled drug.The patient will not drink alcohol nor drive with prescribed medications    In prescribing controlled substances to this patient, I certify that I have obtained and reviewed the medical history this patient I have also made a good aline effort to obtain applicable records from other providers who have treated the patient and records did not demonstrate any increased risk  of substance abuse that would prevent me from prescribing controlled substances.     I have conducted a physical exam and documented it. I have reviewed Mr. Fountain’s prescription history as maintained by the [place] Prescription Monitoring Program.     I have assessed the patient’s risk for abuse, dependency, and addiction using the validated Opioid Risk Tool available at https://www.mdcalc.com/meglqq-kdyw-mqxm-ort-narcotic-abuse.     Given the above, I believe the benefits of controlled substance therapy outweigh the risks. The reasons for prescribing controlled substances include in my professional opinion, controlled substances are a reasonable choice for this patient. Accordingly, I have discussed the risk and benefits, treatment plan, and alternative therapies with the patient. The patient has been consented for the medication and understands the risks.    DISPOSITION:  Patient will be discharged home in stable condition.    FOLLOW UP:  No follow-up provider specified.    OUTPATIENT MEDICATIONS:  Discharge Medication List as of 1/24/2023 11:21 AM        START taking these medications    Details   amLODIPine (NORVASC) 5 MG Tab Take 1 Tablet by mouth every day., Disp-30 Tablet, R-0, Normal            FINAL DIANGOSIS  1. Chronic midline low back pain without sciatica    2. Muscle spasm    3. Primary hypertension      Zuleyka CHAMBERLAIN (Carlos Enrique), am scribing for, and in the presence of, Boom Jacobs M.D..    Electronically signed by: Zuleyka Burnett (Carlos Enrique), 1/24/2023    Boom CHAMBERLAIN M.D. personally performed the services described in this documentation, as scribed by Zuleyka Burnett in my presence, and it is both accurate and complete.      The note accurately reflects work and decisions made by me.  Boom Jacobs M.D.  1/24/2023  4:41 PM

## 2023-01-24 NOTE — LETTER
[facility], EMERGENCY DEPT   [address], [place] [address]  Phone: Dept: [phone] - Fax:        [facility] Progress Report and Disability Certification  Date of Service: 1/24/2023   No Show:  No  Date / Time of Next Visit:     Claim Information   Patient Name: Gab Fountain  Claim Number:     Employer: [employer]  Date of Injury: 2/11/2021     Insurer / TPA:   ID / SSN:     Occupation:  Diagnosis: Diagnoses of Chronic midline low back pain without sciatica, Muscle spasm, and Primary hypertension were pertinent to this visit.    Medical Information   Related to Industrial Injury?   ***   Subjective Complaints:  Exacerbation of chronic lower back pain.    Objective Findings:     Pre-Existing Condition(s):     Assessment:        Status:    Permanent Disability:     Plan:      Diagnostics:      Comments:       Disability Information   Status:   Comments:Continue prior disability restrictions. No changes.    From:     Through:   Restrictions are:     Physical Restrictions   Sitting:    Standing:    Stooping:    Bending:      Squatting:    Walking:    Climbing:    Pushing:      Pulling:    Other:    Reaching Above Shoulder (L):   Reaching Above Shoulder (R):       Reaching Below Shoulder (L):    Reaching Below Shoulder (R):      Not to exceed Weight Limits   Carrying(hrs):   Weight Limit(lb):   Lifting(hrs):   Weight  Limit(lb):     Comments:      Repetitive Actions   Hands: i.e. Fine Manipulations from Grasping:     Feet: i.e. Operating Foot Controls:     Driving / Operate Machinery:     Physician Name: Boom Jacobs Physician Signature:   e-Signature:  , Medical Director   Clinic Name / Location: [place] [facility], EMERGENCY DEPT  [address] [id number] [phone]     Clinic Phone Number: Dept: [phone]   Appointment Time:  Visit Start Time:    Check-In Time:  7:18  AM Visit Discharge Time:    Original-Treating Physician or Chiropractor    Page 2-Insurer/TPA    Page 3-Employer    Page 4-Employee

## 2023-01-24 NOTE — ED TRIAGE NOTES
"Gab Chaves Александр  62 y.o. male  Chief Complaint   Patient presents with    Low Back Pain     Patient reports chronic low back pain. Patient unable to see pain management doctor until the end of February    Hypertension     Patient reports hx of HTN, takes losartan, states BP is \"exacerbated by back pain\"     Patient states he has been dealing with chronic back pain for years, attempting to get into pain specialist, but unable to get appt until the end of February. Patient in obvious discomfort in triage. Patient denies chest pain or SOB.    BP (!) 222/127   Pulse 90   Temp 35.9 °C (96.6 °F) (Temporal)   Resp 18   Ht 1.651 m (5' 5\")   Wt 73.6 kg (162 lb 4.1 oz)   SpO2 98%   BMI 27.00 kg/m²     "

## 2023-01-24 NOTE — LETTER
[facility], EMERGENCY DEPT   [address], [place] [address]  Phone: Dept: [phone] - Fax:        [facility] Progress Report and Disability Certification  Date of Service: 1/24/2023   No Show:  No  Date / Time of Next Visit:     Claim Information   Patient Name: Gab Fountain  Claim Number:  [id number]   Employer: [employer]  Date of Injury: 2/11/2021     Insurer / TPA:  Johnnie Littlejohn ID / SSN:     Occupation:  Diagnosis: Diagnoses of Chronic midline low back pain without sciatica, Muscle spasm, and Primary hypertension were pertinent to this visit.    Medical Information   Related to Industrial Injury? Yes    Subjective Complaints:  Exacerbation of chronic lower back pain.  History of injury on the work with back pain.  Complaining of continued pain to that area   Objective Findings: No signs of Sandrita equina.  Patient is just having exacerbation of his chronic lower back pain   Pre-Existing Condition(s):     Assessment:   Initial Visit    Status: Additional Care Required  Permanent Disability:  Comments:Unknown needs to follow-up with her treating physicians at his occupational therapy    Plan: Medication  Comments:Patient is to follow-up with his current treating physicians as well as pain specialist for continued outpatient treatment and care    Diagnostics:      Comments:       Disability Information   Status:   Comments:Continue prior disability restrictions. No changes.    From:     Through:   Restrictions are:     Physical Restrictions   Sitting:    Standing:    Stooping:    Bending:      Squatting:    Walking:    Climbing:    Pushing:      Pulling:    Other:    Reaching Above Shoulder (L):   Reaching Above Shoulder (R):       Reaching Below Shoulder (L):    Reaching Below Shoulder (R):      Not to exceed Weight Limits   Carrying(hrs):   Weight Limit(lb):   Lifting(hrs):   Weight  Limit(lb):     Comments:       Repetitive Actions   Hands: i.e. Fine Manipulations from Grasping:     Feet: i.e. Operating Foot Controls:     Driving / Operate Machinery:     Physician Name: Boom Jacobs Physician Signature: BOOM Kendall M.D. e-Signature:  , Medical Director   Clinic Name / Location: [facility], EMERGENCY DEPT  [address] [place] [address]  [phone]     Clinic Phone Number: Dept: [phone]   Appointment Time:  Visit Start Time:    Check-In Time:  7:18 AM Visit Discharge Time:    Original-Treating Physician or Chiropractor    Page 2-Insurer/TPA    Page 3-Employer    Page 4-Employee

## 2023-01-24 NOTE — LETTER
[facility], EMERGENCY DEPT   [address], [place] [address]  Phone: Dept: [phone] - Fax:        [facility] Progress Report and Disability Certification  Date of Service: 1/24/2023   No Show:  No  Date / Time of Next Visit:     Claim Information   Patient Name: Gab Fountain  Claim Number:     Employer: [employer]  Date of Injury: 2/11/2021     Insurer / TPA:   ID / SSN: [id number]    Occupation:  Diagnosis: Diagnoses of Chronic midline low back pain without sciatica, Muscle spasm, and Primary hypertension were pertinent to this visit.    Medical Information   Related to Industrial Injury? Yes ***   Subjective Complaints:  Exacerbation of chronic lower back pain.  History of injury on the work with back pain.  Complaining of continued pain to that area   Objective Findings: No signs of Sandrita equina.  Patient is just having exacerbation of his chronic lower back pain   Pre-Existing Condition(s):     Assessment:   Initial Visit    Status: Additional Care Required  Permanent Disability:  Comments:Unknown needs to follow-up with her treating physicians at his occupational therapy    Plan: Medication  Comments:Patient is to follow-up with his current treating physicians as well as pain specialist for continued outpatient treatment and care    Diagnostics:      Comments:       Disability Information   Status:   Comments:Continue prior disability restrictions. No changes.    From:     Through:   Restrictions are:     Physical Restrictions   Sitting:    Standing:    Stooping:    Bending:      Squatting:    Walking:    Climbing:    Pushing:      Pulling:    Other:    Reaching Above Shoulder (L):   Reaching Above Shoulder (R):       Reaching Below Shoulder (L):    Reaching Below Shoulder (R):      Not to exceed Weight Limits   Carrying(hrs):   Weight Limit(lb):   Lifting(hrs):   Weight  Limit(lb):     Comments:      Repetitive Actions    Hands: i.e. Fine Manipulations from Grasping:     Feet: i.e. Operating Foot Controls:     Driving / Operate Machinery:     Physician Name: Boom Jacobs Physician Signature: BOOM Kendall M.D. e-Signature:  , Medical Director   Clinic Name / Location: [facility], EMERGENCY DEPT  [address] [place] [address]  [phone]     Clinic Phone Number: Dept: [phone]   Appointment Time:  Visit Start Time:    Check-In Time:  7:18 AM Visit Discharge Time:    Original-Treating Physician or Chiropractor    Page 2-Insurer/TPA    Page 3-Employer    Page 4-Employee

## 2023-12-26 ENCOUNTER — HOSPITAL ENCOUNTER (EMERGENCY)
Facility: MEDICAL CENTER | Age: 63
End: 2023-12-26
Attending: EMERGENCY MEDICINE

## 2023-12-26 ENCOUNTER — APPOINTMENT (OUTPATIENT)
Dept: RADIOLOGY | Facility: MEDICAL CENTER | Age: 63
End: 2023-12-26
Attending: EMERGENCY MEDICINE

## 2023-12-26 VITALS
RESPIRATION RATE: 16 BRPM | SYSTOLIC BLOOD PRESSURE: 144 MMHG | BODY MASS INDEX: 27.46 KG/M2 | HEART RATE: 81 BPM | WEIGHT: 165 LBS | OXYGEN SATURATION: 99 % | DIASTOLIC BLOOD PRESSURE: 94 MMHG | TEMPERATURE: 98 F

## 2023-12-26 DIAGNOSIS — S00.83XA CONTUSION OF FACE, INITIAL ENCOUNTER: ICD-10-CM

## 2023-12-26 DIAGNOSIS — S81.812A LEG LACERATION, LEFT, INITIAL ENCOUNTER: ICD-10-CM

## 2023-12-26 DIAGNOSIS — S39.012A STRAIN OF LUMBAR REGION, INITIAL ENCOUNTER: ICD-10-CM

## 2023-12-26 PROCEDURE — A9270 NON-COVERED ITEM OR SERVICE: HCPCS | Performed by: EMERGENCY MEDICINE

## 2023-12-26 PROCEDURE — 72131 CT LUMBAR SPINE W/O DYE: CPT | Performed by: RADIOLOGY

## 2023-12-26 PROCEDURE — 304217 HCHG IRRIGATION SYSTEM

## 2023-12-26 PROCEDURE — 72131 CT LUMBAR SPINE W/O DYE: CPT

## 2023-12-26 PROCEDURE — 700111 HCHG RX REV CODE 636 W/ 250 OVERRIDE (IP): Mod: JZ | Performed by: EMERGENCY MEDICINE

## 2023-12-26 PROCEDURE — 99285 EMERGENCY DEPT VISIT HI MDM: CPT

## 2023-12-26 PROCEDURE — 70450 CT HEAD/BRAIN W/O DYE: CPT

## 2023-12-26 PROCEDURE — 96374 THER/PROPH/DIAG INJ IV PUSH: CPT

## 2023-12-26 PROCEDURE — 700102 HCHG RX REV CODE 250 W/ 637 OVERRIDE(OP): Performed by: EMERGENCY MEDICINE

## 2023-12-26 PROCEDURE — 700101 HCHG RX REV CODE 250: Performed by: EMERGENCY MEDICINE

## 2023-12-26 RX ORDER — LIDOCAINE 50 MG/G
1 PATCH TOPICAL EVERY 24 HOURS
Qty: 10 PATCH | Refills: 0 | Status: SHIPPED | OUTPATIENT
Start: 2023-12-26

## 2023-12-26 RX ORDER — MORPHINE SULFATE 4 MG/ML
4 INJECTION INTRAVENOUS ONCE
Status: COMPLETED | OUTPATIENT
Start: 2023-12-26 | End: 2023-12-26

## 2023-12-26 RX ORDER — CYCLOBENZAPRINE HCL 10 MG
10 TABLET ORAL 3 TIMES DAILY PRN
Qty: 30 TABLET | Refills: 0 | Status: SHIPPED | OUTPATIENT
Start: 2023-12-26

## 2023-12-26 RX ORDER — MORPHINE SULFATE 15 MG/1
15 TABLET, FILM COATED, EXTENDED RELEASE ORAL ONCE
Status: COMPLETED | OUTPATIENT
Start: 2023-12-26 | End: 2023-12-26

## 2023-12-26 RX ORDER — LIDOCAINE 4 G/G
1 PATCH TOPICAL EVERY 24 HOURS
Status: DISCONTINUED | OUTPATIENT
Start: 2023-12-26 | End: 2023-12-26 | Stop reason: HOSPADM

## 2023-12-26 RX ORDER — MORPHINE SULFATE 15 MG/1
7.5 TABLET ORAL EVERY 6 HOURS PRN
Qty: 8 TABLET | Refills: 0 | Status: SHIPPED | OUTPATIENT
Start: 2023-12-26 | End: 2023-12-30

## 2023-12-26 RX ADMIN — LIDOCAINE 1 PATCH: 4 PATCH TOPICAL at 18:06

## 2023-12-26 RX ADMIN — MORPHINE SULFATE 15 MG: 15 TABLET, FILM COATED, EXTENDED RELEASE ORAL at 19:43

## 2023-12-26 RX ADMIN — MORPHINE SULFATE 4 MG: 4 INJECTION, SOLUTION INTRAMUSCULAR; INTRAVENOUS at 18:06

## 2023-12-26 ASSESSMENT — FIBROSIS 4 INDEX: FIB4 SCORE: 1.94

## 2023-12-27 NOTE — ED NOTES
BS report from [facility]  Pt resting in NAD, VSS, Call light in reach  Family at BS  Pt is not on oxygen, not on isolation, and here for GLF and low back/LLE pain  Pt pending re-eval after results have just finalized  Pt is A&O x4, and unk ambulation at this time d/t low back pain

## 2023-12-27 NOTE — ED PROVIDER NOTES
ED Provider Note    CHIEF COMPLAINT  Chief Complaint   Patient presents with    Back Pain     Fall 2 days ago down and embankment.  Pt admits to ETOH use that day.  Pt has hx chronic back pain.  Pain has been slowly increasing since the fall.  States unable to walk.  Pt rec'd 150 mcg fentanyl and versed 2 mg pta         EXTERNAL RECORDS REVIEWED  Outpatient Notes chronic low back pain followed by Dr. Post has had lumbar exploration fusion as well as discectomy    HPI/ROS  LIMITATION TO HISTORY   Select: : None  OUTSIDE HISTORIAN(S):  EMS the patient was given 150 of fentanyl and 2 of versed    Gab Anastacio Fountain is a 63 y.o. male who presents to the emerged part with complaint of a fall 2 days ago.  He states they were going [place] a hike on [employer] walking up an embankment when he slipped lost his footing hit his face and his leg and then fell back and hit his back.  He states he had progressively worsening pain since the fall pain just made it really hard to walk.  He is taking his oxycodone at home without leaf symptoms.  No new worsening weakness numbness or tingling.  He states his tetanus is up-to-date.  Endorses little bit of a headache but no neck discomfort.  He was intoxicated at the time but denies daily drinking.    PAST MEDICAL HISTORY   has a past medical history of Hypertension and Infectious disease (2010).    SURGICAL HISTORY   has a past surgical history that includes other; incision and drainage general (5/19/2011); hardware removal neuro (5/19/2011); incision and drainage orthopedic (5/27/2011); lumbar exploration (5/27/2011); and fusion, spine, lumbar, plif (5/27/2011).    FAMILY HISTORY  Family History   Problem Relation Age of Onset    COPD Mother     Hypertension Mother     Cancer Father         non-small cell carcinoma; prostate    Cancer Maternal Grandmother         breast    Diabetes Neg Hx     Heart Disease Neg Hx     Hyperlipidemia Neg Hx     Stroke Neg Hx        SOCIAL  HISTORY  Social History     Tobacco Use    Smoking status: Never    Smokeless tobacco: Never   Vaping Use    Vaping Use: Never used   Substance and Sexual Activity    Alcohol use: Yes     Comment: rare    Drug use: No    Sexual activity: Yes     Partners: Female       CURRENT MEDICATIONS  Home Medications    **Home medications have not yet been reviewed for this encounter**         ALLERGIES  Allergies   Allergen Reactions    Ace Inhibitors      tinnitus       PHYSICAL EXAM  VITAL SIGNS: BP (!) 142/98   Pulse (!) 109   Temp 36.7 °C (98.1 °F) (Temporal)   Resp 18   Wt 74.8 kg (165 lb)   SpO2 95%   BMI 27.46 kg/m²    Pulse OX: Pulse Oxygen level is normal on room air  Constitutional: Alert in no apparent distress.  HENT: Normocephalic, right periorbital ecchymosis minor abrasions to the scalp and forehead MMM no midline cervical spine tenderness  Eyes: PERound. no conjunctival hemorrhage on the right non-icteric.  Extraocular muscles intact  Heart: Regular rate and rhythm, intact distal pulses   Lungs: Symmetrical movement, no resp distress   Abdomen: Non-tender, non-distended, normal bowel sounds  EXT/Back no midline T-spine tenderness mild midline L-spine tenderness without swelling step-offs or erythema  Left lower extremity open laceration to the anterior shin about 2 cm which has a lot of dressing applied to it with an underlying hematoma not actively bleeding  Skin: Warm, Dry, No erythema, No rash.   Neurologic: Alert and oriented, sensation intact distally bilateral lower extremity strength 4 out of 5 bilateral lower extremity secondary to pain      DIAGNOSTIC STUDIES / PROCEDURES      LABS  Labs Reviewed - No data to display      RADIOLOGY  I have independently interpreted the diagnostic imaging associated with this visit and am waiting the final reading from the radiologist.   My preliminary interpretation is as follows:     CT head without fracture or bleed  CT L-spine AC degenerative changes and prior  surgical changes with no acute fracture    Radiologist interpretation:     CT-LSPINE W/O PLUS RECONS   Final Result      1.  No fracture detected.   2.  Degenerative changes and postoperative changes, see detailed description of above findings.   3.  Further/more accurate evaluation may be obtained with lumbar MRI with and without contrast if clinically indicated.      CT-HEAD W/O   Final Result      Head CT without contrast within normal limits. No evidence of acute cerebral infarction, hemorrhage or mass lesion.               COURSE & MEDICAL DECISION MAKING    ED Observation Status? Yes; I am placing the patient in to an observation status due to a diagnostic uncertainty as well as therapeutic intensity. Patient placed in observation status at 5:53 PM, 12/26/2023.     Observation plan is as follows: CT scans morphine lidocaine patch    Upon Reevaluation, the patient's condition has: Improved; and will be discharged.    Patient discharged from ED Observation status at 9:30 PM (Time) 1226 (Date).     INITIAL ASSESSMENT, COURSE AND PLAN  Care Narrative:     Patient is 63 a month 48 hours after a fall with worsening discomfort in his back he has been using heat and ice.  No red flags no weakness numbness or tingling just pain.  He has an open wound to his left shin but states his tetanus is up-to-date as well as evidence of facial and head trauma with mild headache.  He was intoxicated at the time of the fall this head CT was ordered there is no midline neck tenderness but did with the L-spine tenderness CT was also ordered of that he will be treated with IV morphine and a lidocaine patch.  \    7:33 PM  I reassessed patient at the bedside he will be given MS Contin his imaging which reveals no occult fracture no bleed no subluxation just a postop surgical changes.  There is no red flags interning weakness numbness or tingling just severe pain.  He is feeling better after the morphine.  His wound is covered in a lot  of quick clot I guess they but at the time of the injuries were going to wash it out thoroughly.  Unfortunately it has been 2 days from not going to close this wound we need to clean it out and get a better dressing.    DISPOSITION AND DISCUSSIONS  9:30 PM patient's wound actually looks clean and dry it is open but not actively bleeding we discussed wound care at home and closure by secondary intention over time.  He will be sent home with a short course of stronger pain management we discussed ice packs and follow-up with his spinal surgeon.  Return precautions for any new worsening issues he feels comfortable going home    In prescribing controlled substances to this patient, I certify that I have obtained and reviewed the medical history of Gab Fountain. I have also made a good aline effort to obtain applicable records from other providers who have treated the patient and records did not demonstrate any increased risk of substance abuse that would prevent me from prescribing controlled substances.     I have conducted a physical exam and documented it. I have reviewed Mr. Fountain’s prescription history as maintained by the [place] Prescription Monitoring Program.     I have assessed the patient’s risk for abuse, dependency, and addiction using the validated Opioid Risk Tool available at https://www.mdcalc.com/ujhxls-vfjc-vnqs-ort-narcotic-abuse. 0    Given the above, I believe the benefits of controlled substance therapy outweigh the risks. The reasons for prescribing controlled substances include non-narcotic, oral analgesic alternatives have been inadequate for pain control. Accordingly, I have discussed the risk and benefits, treatment plan, and alternative therapies with the patient.       I have discussed management of the patient with the following physicians and NEHEMIAS's:  none    Discussion of management with other QHP or appropriate source(s): None     Escalation of care considered, and ultimately not  performed:blood analysis    Barriers to care at this time, including but not limited to:  na .     Decision tools and prescription drugs considered including, but not limited to: Pain Medications were prescribed .    The patient will return for new or worsening symptoms and is stable at the time of discharge.    The patient is referred to a primary physician for blood pressure management, diabetic screening, and for all other preventative health concerns.    DISPOSITION:  Patient will be discharged home in stable condition.    FOLLOW UP:  Ashish Rivera, [email or web address].PRachelN.  [address] [place] [address] [phone]    Schedule an appointment as soon as possible for a visit       Gab Post M.D.  [address]  [place] [address]  [phone]    Call       [facility], Emergency Dept  [address] [facility] [id number] [phone]    If symptoms worsen      OUTPATIENT MEDICATIONS:  Discharge Medication List as of 12/26/2023  8:08 PM        START taking these medications    Details   morphine (MS IR) 15 MG tablet Take 0.5 Tablets by mouth every 6 hours as needed for Severe Pain for up to 4 days., Disp-8 Tablet, R-0, Normal      cyclobenzaprine (FLEXERIL) 10 mg Tab Take 1 Tablet by mouth 3 times a day as needed for Muscle Spasms or Moderate Pain., Disp-30 Tablet, R-0, Normal      lidocaine (LIDODERM) 5 % Patch Place 1 Patch on the skin every 24 hours., Disp-10 Patch, R-0, Normal               FINAL DIAGNOSIS  1. Strain of lumbar region, initial encounter    2. Leg laceration, left, initial encounter    3. Contusion of face, initial encounter           Electronically signed by: Kimberly Fallon M.D., 12/26/2023 5:53 PM

## 2023-12-27 NOTE — DISCHARGE INSTRUCTIONS
Please use ice no heat for the next 72 hours.  Take the pain medications as prescribed.  As well as the muscle relaxants.  Follow-up with your spinal surgeon return to the ED for any new worsening weakness numbness or tingling.

## 2023-12-27 NOTE — ED NOTES
Pt signed and given d/c papers. Discussed x3 rx sent to pref pharmacy and f/u w/ ortho- info highlighted. Wound care education given and supplies for home. Pt denies further questions/concerns. This RN wheeled pt out to lobby and assisted into son's car for ride home. Pt is d/c in stable condition, w/ all belongings. Verbalizes understanding of all d/c instructions and wound care

## 2023-12-27 NOTE — ED TRIAGE NOTES
Chief Complaint   Patient presents with    Back Pain     Fall 2 days ago down and embankment.  Pt admits to ETOH use that day.  Pt has hx chronic back pain.  Pain has been slowly increasing since the fall.  States unable to walk.  Pt rec'd 150 mcg fentanyl and versed 2 mg pta

## 2023-12-27 NOTE — ED NOTES
LLE irrigated w/ 1000 more NS w/ high pressure after using lubricant to remove blood, clots, and dried quick clot. Steri strips applied and bandaged w/ tube gauze- pt given supplies for home   ERP evaluated at BS and discussed w/ unable to repair w/ sutures as it is several days old but avulsion looks better, and good to go

## 2024-05-06 ENCOUNTER — PATIENT MESSAGE (OUTPATIENT)
Dept: HEALTH INFORMATION MANAGEMENT | Facility: OTHER | Age: 64
End: 2024-05-06

## 2025-02-17 NOTE — LETTER
Caller: Norma Thomas    Relationship: Self    Best call back number: [phone]     Caller requesting test results: LABS AND URINALYSIS     When was the test performed: 2.10.25    Where was the test performed: IN OFFICE    Additional notes: PLEASE CALL PATIENT TO DISCUSS RESULTS.          "[address],   [address] [place] [address]  Phone:  [phone] - Fax:  [phone]   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 *** Diagnosis: Diagnoses of Left lumbar radiculopathy, History of lumbar spinal fusion, and Chronic low back pain, unspecified back pain laterality, with sciatica presence unspecified were pertinent to this visit.    Medical Information   Related to Industrial Injury?   Comments:await final determination from insurance ***   Subjective Complaints:  Date of injury 7/30/2018.  Mechanism of injury- \"dumping product waist 5-6 cans\".  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  He was seen in urgent care 2 days ago.  He indicates he is worse.  He has constant dull aching left-sided lower back pain with intermittent sharp pains.  The pain radiates to the right calf.  He also notes numbness in the left great toe.  No urinary symptoms.  Past medical history is notable for an industrial low back injury several years ago which culminated in a lumbar fusion from L3-S1 in 2009.  Complications resulted in 3 follow-up procedures the last 2011.  He received a PPI rating of 25%.  Since that time he has been followed by the [facility] on chronic opioid medication.  His most recent visit to the clinic was 3 months ago.  Apparently, since that time he has been on no pain medication.   Objective Findings: Appearance: Well-developed, well-nourished.   Mental Status: Mood and Affect normal. Pleasant. Cooperative. Appropriate.   ENT: Oropharynx clear. Moist mucous " membranes. Hearing normal.   Eyes: Pupils reactive. Conjunctiva normal. No scleral icterus.   Neck: Trachea Midline. No thyromegaly. No masses.  Cardiovascular: Normal rate. Regular rhythm. Normal heart sounds.   Chest: Effort normal. Breath sounds clear.   Skin: Skin is warm and dry. No rash.   Musculoskeletal: Back exam shows a long lumbar surgical scar.  Mild tenderness in the left lumbosacral area.  Moderate pain with flexion.  Pain with left side bending.  Straight leg raise positive on the left.  Deep tendon reflexes 2+ and symmetric.     Pre-Existing Condition(s):     Assessment:   Condition Worsened    Status: Additional Care Required  Permanent Disability:No    Plan: MedicationMedication (NOT at Work)PTDiagnosticsConsultation    Diagnostics: X-ray    Comments:  Physiatry consultation    Disability Information   Status: Released to Restricted Duty    From:  8/2/2018  Through: 8/23/2018 Restrictions are: Temporary   Physical Restrictions   Sitting:    Standing:    Stooping:    Bending:  < or = to 1 hr/day   Squatting:    Walking:    Climbing:    Pushing:      Pulling:    Other:    Reaching Above Shoulder (L):   Reaching Above Shoulder (R):       Reaching Below Shoulder (L):    Reaching Below Shoulder (R):      Not to exceed Weight Limits   Carrying(hrs):   Weight Limit(lb):   Lifting(hrs):   Weight  Limit(lb): < or = to 10 pounds   Comments:      Repetitive Actions   Hands: i.e. Fine Manipulations from Grasping:     Feet: i.e. Operating Foot Controls:     Driving / Operate Machinery:     Physician Name: German Oswald M.D. Physician Signature: GERMAN Brewer M.D. e-Signature: Dr. Jin Rod, Medical Director   Clinic Name / Location: [address],   [address]  [place] [address] Clinic Phone Number: Dept: [phone]   Appointment Time: 1:15 Pm Visit Start Time: 1:42 PM   Check-In Time:  1:38 Pm Visit Discharge Time:  ***   Original-Treating Physician  or Chiropractor    Page 2-Insurer/TPA    Page 3-Employer    Page 4-Employee